# Patient Record
Sex: FEMALE | Race: BLACK OR AFRICAN AMERICAN | NOT HISPANIC OR LATINO | Employment: UNEMPLOYED | URBAN - METROPOLITAN AREA
[De-identification: names, ages, dates, MRNs, and addresses within clinical notes are randomized per-mention and may not be internally consistent; named-entity substitution may affect disease eponyms.]

---

## 2017-03-04 ENCOUNTER — HOSPITAL ENCOUNTER (EMERGENCY)
Facility: HOSPITAL | Age: 27
Discharge: HOME | End: 2017-03-04
Attending: FAMILY MEDICINE
Payer: MEDICAID

## 2017-03-04 VITALS
HEIGHT: 68 IN | DIASTOLIC BLOOD PRESSURE: 76 MMHG | SYSTOLIC BLOOD PRESSURE: 107 MMHG | HEART RATE: 115 BPM | RESPIRATION RATE: 15 BRPM | TEMPERATURE: 98 F | OXYGEN SATURATION: 98 %
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
## 2017-03-04 DIAGNOSIS — S43.402A SPRAIN SHOULDER/ARM, LEFT, INITIAL ENCOUNTER: Primary | ICD-10-CM

## 2017-03-04 PROCEDURE — 25000003 PHARM REV CODE 250: Performed by: FAMILY MEDICINE

## 2017-03-04 PROCEDURE — 99283 EMERGENCY DEPT VISIT LOW MDM: CPT

## 2017-03-04 RX ORDER — ACETAMINOPHEN 500 MG
1000 TABLET ORAL
Status: COMPLETED | OUTPATIENT
Start: 2017-03-04 | End: 2017-03-04

## 2017-03-04 RX ORDER — KETOROLAC TROMETHAMINE 10 MG/1
10 TABLET, FILM COATED ORAL EVERY 6 HOURS PRN
Qty: 20 TABLET | Refills: 0 | Status: SHIPPED | OUTPATIENT
Start: 2017-03-04 | End: 2017-03-09

## 2017-03-04 RX ORDER — KETOROLAC TROMETHAMINE 10 MG/1
10 TABLET, FILM COATED ORAL EVERY 6 HOURS PRN
Qty: 20 TABLET | Refills: 0 | Status: SHIPPED | OUTPATIENT
Start: 2017-03-04 | End: 2017-03-04

## 2017-03-04 RX ADMIN — ACETAMINOPHEN 1000 MG: 500 TABLET ORAL at 11:03
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
## 2017-03-04 NOTE — DISCHARGE INSTRUCTIONS
Shoulder Sprain  A sprain is a stretching or tearing of the ligaments that hold a joint together. A sprain may take up to 8 weeks to fully heal, depending on how severe it is. Moderate to severe shoulder sprains are treated with a sling or shoulder immobilizer. Minor sprains can be treated without any special support.  Home care  The following guidelines will help you care for your injury at home:  · If a sling was given to you, leave it in place for the time advised by your healthcare provider. If you arent sure how long to wear it, ask for advice. If the sling becomes loose, adjust it so that your forearm is level with the ground. Your shoulder should feel well supported.  · Put an ice pack on the injured area for 20 minutes every 1 to 2 hours the first day. You can make your own ice pack by putting ice cubes in a plastic bag. A bag of frozen peas or something similar works well too. Wrap the bag in a thin towel. Continue with ice packs 3 to 4 times a day for the next 2 to 3 days. Then use the pack as needed to ease pain and swelling.  · You may use acetaminophen or ibuprofen to control pain, unless another pain medicine was prescribed. If you have chronic liver or kidney disease, talk with your healthcare provider before using these medicines. Also talk with your provider if youve had a stomach ulcer or gastrointestinal bleeding.  · Shoulder joints become stiff if left in a sling for too long. You should start range of motion exercises about 7 to 10 days after the injury. Talk with your provider to find out what type of exercises to do and how soon to start.  Follow-up care  Follow up with your healthcare provider, or as advised.  Any X-rays you had today dont show any broken bones, breaks, or fractures. Sometimes fractures dont show up on the first X-ray. Bruises and sprains can sometimes hurt as much as a fracture. These injuries can take time to heal completely. If your symptoms dont improve or they get  worse, talk with your provider. You may need a repeat X-ray or other treatments.  When to seek medical advice  Call your healthcare provider right away if any of these occur:  · Shoulder pain or swelling in your arm that gets worse  · Fingers become cold, blue, numb, or tingly  · Large amount of bruising of the shoulder or upper arm  · Fever or chills  Date Last Reviewed: 8/1/2016  © 6523-2062 [employer]. [address] [place] [address], [place] [address]. All rights reserved. This information is not intended as a substitute for professional medical care. Always follow your healthcare professional's instructions.
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
## 2017-05-04 ENCOUNTER — HOSPITAL ENCOUNTER (EMERGENCY)
Facility: HOSPITAL | Age: 27
Discharge: HOME | End: 2017-05-04
Attending: FAMILY MEDICINE
Payer: MEDICAID

## 2017-05-04 VITALS
BODY MASS INDEX: 18.34 KG/M2 | DIASTOLIC BLOOD PRESSURE: 69 MMHG | HEIGHT: 68 IN | RESPIRATION RATE: 15 BRPM | WEIGHT: 121 LBS | OXYGEN SATURATION: 97 % | HEART RATE: 85 BPM | SYSTOLIC BLOOD PRESSURE: 108 MMHG | TEMPERATURE: 99 F
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
## 2017-05-04 DIAGNOSIS — S61.432A PUNCTURE WOUND OF LEFT HAND WITHOUT FOREIGN BODY, INITIAL ENCOUNTER: Primary | ICD-10-CM

## 2017-05-04 PROCEDURE — 25000003 PHARM REV CODE 250: Performed by: FAMILY MEDICINE

## 2017-05-04 PROCEDURE — 90715 TDAP VACCINE 7 YRS/> IM: CPT | Performed by: FAMILY MEDICINE

## 2017-05-04 PROCEDURE — 63600175 PHARM REV CODE 636 W HCPCS: Performed by: FAMILY MEDICINE

## 2017-05-04 PROCEDURE — 99283 EMERGENCY DEPT VISIT LOW MDM: CPT

## 2017-05-04 PROCEDURE — 90471 IMMUNIZATION ADMIN: CPT | Performed by: FAMILY MEDICINE

## 2017-05-04 RX ORDER — IBUPROFEN 400 MG/1
800 TABLET ORAL
Status: COMPLETED | OUTPATIENT
Start: 2017-05-04 | End: 2017-05-04

## 2017-05-04 RX ORDER — CEPHALEXIN 500 MG/1
500 CAPSULE ORAL EVERY 12 HOURS
Qty: 10 CAPSULE | Refills: 0 | Status: SHIPPED | OUTPATIENT
Start: 2017-05-04 | End: 2017-05-09

## 2017-05-04 RX ADMIN — IBUPROFEN 800 MG: 400 TABLET ORAL at 11:05

## 2017-05-04 RX ADMIN — CLOSTRIDIUM TETANI TOXOID ANTIGEN (FORMALDEHYDE INACTIVATED), CORYNEBACTERIUM DIPHTHERIAE TOXOID ANTIGEN (FORMALDEHYDE INACTIVATED), BORDETELLA PERTUSSIS TOXOID ANTIGEN (GLUTARALDEHYDE INACTIVATED), BORDETELLA PERTUSSIS FILAMENTOUS HEMAGGLUTININ ANTIGEN (FORMALDEHYDE INACTIVATED), BORDETELLA PERTUSSIS PERTACTIN ANTIGEN, AND BORDETELLA PERTUSSIS FIMBRIAE 2/3 ANTIGEN 0.5 ML: 5; 2; 2.5; 5; 3; 5 INJECTION, SUSPENSION INTRAMUSCULAR at 11:05

## 2017-05-04 RX ADMIN — BACITRACIN, NEOMYCIN, POLYMYXIN B 1 EACH: 400; 3.5; 5 OINTMENT TOPICAL at 11:05
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
## 2017-05-19 ENCOUNTER — HOSPITAL ENCOUNTER (EMERGENCY)
Facility: HOSPITAL | Age: 27
Discharge: HOME | End: 2017-05-19
Attending: FAMILY MEDICINE
Payer: MEDICAID

## 2017-05-19 VITALS
SYSTOLIC BLOOD PRESSURE: 112 MMHG | BODY MASS INDEX: 18.64 KG/M2 | HEART RATE: 70 BPM | OXYGEN SATURATION: 98 % | TEMPERATURE: 99 F | DIASTOLIC BLOOD PRESSURE: 64 MMHG | HEIGHT: 68 IN | WEIGHT: 123 LBS | RESPIRATION RATE: 18 BRPM

## 2017-05-19 DIAGNOSIS — M25.519 SHOULDER PAIN: Primary | ICD-10-CM

## 2017-05-19 PROCEDURE — 99283 EMERGENCY DEPT VISIT LOW MDM: CPT

## 2017-05-19 RX ORDER — BENZONATATE 100 MG/1
100 CAPSULE ORAL 4 TIMES DAILY
Status: ON HOLD | COMMUNITY
End: 2017-07-22 | Stop reason: HOSPADM

## 2017-05-19 RX ORDER — HYDROCODONE BITARTRATE AND ACETAMINOPHEN 5; 325 MG/1; MG/1
1 TABLET ORAL EVERY 8 HOURS PRN
Qty: 12 TABLET | Refills: 0 | Status: ON HOLD | OUTPATIENT
Start: 2017-05-19 | End: 2017-10-22 | Stop reason: HOSPADM

## 2017-05-19 NOTE — DISCHARGE INSTRUCTIONS
Arthralgia    Arthralgia is the term for pain in or around the joint. It is a symptom, not a disease. This pain may involve one or more joints. In some cases, the pain moves from joint to joint.  There are many causes for joint pain. These include:  · Injury  · Osteoarthritis (wearing out of the joint surface)  · Gout (inflammation of the joint due to crystals in the joint fluid)  · Infection inside the joint    · Bursitis (inflammation of the fluid-filled sacs around the joint)  · Autoimmune disorders such as rheumatoid arthritis or lupus  · Tendonitis (inflamation of chords that attach muscle to bone)  Home care  · Rest the involved joint(s) until your symptoms improve.   · You may be prescribed pain medication. If none is prescribed, you may use acetaminophen or ibuprofen to control pain and inflammation.  Follow up  Follow up with your healthcare provider or our staff as advised.  When to seek medical care  Contact your healthcare provider right away if any of the following occurs:  · Pain, swelling, or redness of joint increases  · Pain worsens or recurs after a period of improvement  · Pain moves to other joints  · You cannot bear weight on the affected joint   · You cannot move the affected joint  · Joint appears deformed  · New rash appears  · Fever of 101ºF (38.8ºC) or higher, or as directed by your healthcare provider  Date Last Reviewed: 4/26/2015  © 4290-7221 [employer]. [address] [place] [address], [place] [address]. All rights reserved. This information is not intended as a substitute for professional medical care. Always follow your healthcare professional's instructions.          AC Joint Sprain (Adult)    The AC or acromioclavicular joint is at the end of the collar bone, or clavicle, near the shoulder. The AC joint is made of 4 ligaments that hold the collar bone to the shoulder blade, or scapula. With an AC joint sprain, these ligaments may be partly or fully torn. In both cases this  causes pain and swelling at the end of the collar bone. If the ligaments are completely torn, the collar bone will rise up.  AC joint sprains are given a grade depending on whether they are mild, moderate, or severe:  · Grade 1. A mild sprain, with minor damage to the ligaments. The collar bone stays in place.  · Grade 2. A moderate sprain. The ligaments are partly torn. The collar bone is moved out of place. The injured shoulder may look lower and flatter than the other shoulder.  · Grade 3. The most severe kind of sprain. The ligaments are completely torn. The collar bone is no longer joined to the shoulder blade. The collar bone rises up. This creates a bump on top of the shoulder. The ligaments heal in this position, so the bump does not go away. It is possible to have surgery to correct the bump. But normal shoulder function will return even without surgery.  An AC sprain will take up to 6 weeks to heal, depending on how severe it is. It is often treated with a sling. Or a sling and an elastic wrap around the chest may be used. Physical therapy may be needed to help the shoulder keep full range of motion. Once healed, you can expect full recovery of shoulder function.  Home care  · Your provider may prescribe medicines for pain. Or you may use over-the-counter pain medicines. Talk with your provider beforetaking medicines if you have chronic liver or kidney disease, or have ever had a stomach ulcer or GI (gastrointestinal) bleeding.  · Make an appointment right away to see your provider or an orthopedic or bone doctor, for further evaluation and treatment of the injury.  · Use the injured area as little as possible. This will help decrease pain and swelling and allow the area to heal.  · Place an ice pack over the injured area for 15 minutes. Do this every 4 to 6 hours for the first 24 to 48 hours, or as directed. Keep using ice packs to ease pain and swelling as directed by your provider or the orthopedic  doctor.  · To make an ice pack, put ice cubes in a plastic bag that seals at the top. Wrap the bag in a clean, thin towel or cloth. Never put ice or an ice pack directly on the skin. The ice pack can be put right on the wrap or sling. As the ice melts, be careful to not get the wrap or sling wet.  · A sling alone is often enough. Sometime a sling and a wrap around the chest may be used to help ease pain, if needed. This also helps keep your injured arm from moving. Use these devices as advised until you are seen by your healthcare provider or the orthopedic doctor. Always ask when the wrap should be worn. Always ask when the wrap can be removed.  · Wear the sling while awake or as advised, until you are scheduled to see your healthcare provider or an orthopedic doctor. You may remove the sling to sleep. You may remove the sling to bathe. Make sure the sling is comfortable and keeps your arm raised, as advised by the provider. Follow the providers instructions on how to use the sling. Always ask when you should wear the sling. Always ask when the sling can be removed.  · Shoulder joints become stiff if they are kept still for too long. Talk with your healthcare provider or the orthopedic doctor about range of motion exercises and possible physical therapy.  Follow-up care  Follow up with your healthcare provider, or as advised. Your provider may refer you to a specialist such as an orthopedic, or bone, doctor.  If X-rays were taken, you will be told of any new findings that may affect your care.  When to seek medical advice  Call your healthcare provider right away if any of these occur:  · Your shoulder looks off-balance  · You have increased pain, swelling, or bruising  · You have increased pain even after using prescribed pain medicine  · You have continuing pain   · Your hand or arm becomes cold, blue, numb, or tingly  · You have trouble moving your shoulder, wrist, or elbow due to stiffness  Date Last Reviewed:  10/8/2015  © 2023-9199 [employer]. [address] [place] [address], [place] [address]. All rights reserved. This information is not intended as a substitute for professional medical care. Always follow your healthcare professional's instructions.

## 2017-05-19 NOTE — ED AVS SNAPSHOT
[facility]  [address]  [place] [id number]               Diana Diop   2017 12:40 PM   ED    Description:  Female : 1990   Department:  [facility]           Your Care was Coordinated By:     Provider Role From To    Elias Schmidt MD Attending Provider 17 1252 --      Reason for Visit     costochondritis           Diagnoses this Visit        Comments    Shoulder pain    -  Primary       ED Disposition     ED Disposition Condition Comment    Discharge             To Do List           Follow-up Information     Follow up with KARLA Blanco.    Specialty:  Family Medicine    Contact information:    [id number]  [place] [id number]  [phone]         These Medications        Disp Refills Start End    hydrocodone-acetaminophen 5-325mg (NORCO) 5-325 mg per tablet 12 tablet 0 2017     Take 1 tablet by mouth every 8 (eight) hours as needed for Pain. - Oral    [facility] [address] [place] [address] [address] [facility] Ph #: [phone]         [facility]     [facility] -  Assistance  Unless otherwise directed by your provider, please contact [facility], our nurse care line that is available for  assistance.     Registered nurses in the [facility] provide: appointment scheduling, clinical advisement, health education, and other advisory services.  Call: [phone] (toll free)               Medications           Message regarding Medications     Verify the changes and/or additions to your medication regime listed below are the same as discussed with your clinician today.  If any of these changes or additions are incorrect, please notify your healthcare provider.        START taking these NEW medications        Refills    hydrocodone-acetaminophen 5-325mg (NORCO) 5-325 mg per tablet 0    Sig: Take 1 tablet  "by mouth every 8 (eight) hours as needed for Pain.    Class: Print    Route: Oral           Verify that the below list of medications is an accurate representation of the medications you are currently taking.  If none reported, the list may be blank. If incorrect, please contact your healthcare provider. Carry this list with you in case of emergency.           Current Medications     benzonatate (TESSALON) 100 MG capsule Take 100 mg by mouth 4 (four) times daily.    ferrous sulfate 325 mg (65 mg iron) Tab tablet Take 1 tablet (325 mg total) by mouth daily with breakfast.    hydrocodone-acetaminophen 5-325mg (NORCO) 5-325 mg per tablet Take 1 tablet by mouth every 8 (eight) hours as needed for Pain.           Clinical Reference Information           Your Vitals Were     BP Pulse Temp Resp Height Weight    110/63 (BP Location: Left arm, Patient Position: Sitting) 68 99 °F (37.2 °C) (Oral) 17 5' 8" (1.727 m) 55.8 kg (123 lb)    Last Period SpO2 BMI          05/19/2017 (Exact Date) 100% 18.7 kg/m2        Allergies as of 5/19/2017     No Known Allergies      Immunizations Administered on Date of Encounter - 5/19/2017     None      ED Micro, Lab, POCT     None      ED Imaging Orders     Start Ordered       Status Ordering Provider    05/19/17 1258 05/19/17 1257  X-Ray Shoulder Complete 2 View Right  1 time imaging      Final result         Discharge Instructions         Arthralgia    Arthralgia is the term for pain in or around the joint. It is a symptom, not a disease. This pain may involve one or more joints. In some cases, the pain moves from joint to joint.  There are many causes for joint pain. These include:  · Injury  · Osteoarthritis (wearing out of the joint surface)  · Gout (inflammation of the joint due to crystals in the joint fluid)  · Infection inside the joint    · Bursitis (inflammation of the fluid-filled sacs around the joint)  · Autoimmune disorders such as rheumatoid arthritis or lupus  · Tendonitis " (inflamation of chords that attach muscle to bone)  Home care  · Rest the involved joint(s) until your symptoms improve.   · You may be prescribed pain medication. If none is prescribed, you may use acetaminophen or ibuprofen to control pain and inflammation.  Follow up  Follow up with your healthcare provider or our staff as advised.  When to seek medical care  Contact your healthcare provider right away if any of the following occurs:  · Pain, swelling, or redness of joint increases  · Pain worsens or recurs after a period of improvement  · Pain moves to other joints  · You cannot bear weight on the affected joint   · You cannot move the affected joint  · Joint appears deformed  · New rash appears  · Fever of 101ºF (38.8ºC) or higher, or as directed by your healthcare provider  Date Last Reviewed: 4/26/2015 © 2000-2016 [employer]. [address] [place] [address]. All rights reserved. This information is not intended as a substitute for professional medical care. Always follow your healthcare professional's instructions.          AC Joint Sprain (Adult)    The AC or acromioclavicular joint is at the end of the collar bone, or clavicle, near the shoulder. The AC joint is made of 4 ligaments that hold the collar bone to the shoulder blade, or scapula. With an AC joint sprain, these ligaments may be partly or fully torn. In both cases this causes pain and swelling at the end of the collar bone. If the ligaments are completely torn, the collar bone will rise up.  AC joint sprains are given a grade depending on whether they are mild, moderate, or severe:  · Grade 1. A mild sprain, with minor damage to the ligaments. The collar bone stays in place.  · Grade 2. A moderate sprain. The ligaments are partly torn. The collar bone is moved out of place. The injured shoulder may look lower and flatter than the other shoulder.  · Grade 3. The most severe kind of sprain. The ligaments are completely  torn. The collar bone is no longer joined to the shoulder blade. The collar bone rises up. This creates a bump on top of the shoulder. The ligaments heal in this position, so the bump does not go away. It is possible to have surgery to correct the bump. But normal shoulder function will return even without surgery.  An AC sprain will take up to 6 weeks to heal, depending on how severe it is. It is often treated with a sling. Or a sling and an elastic wrap around the chest may be used. Physical therapy may be needed to help the shoulder keep full range of motion. Once healed, you can expect full recovery of shoulder function.  Home care  · Your provider may prescribe medicines for pain. Or you may use over-the-counter pain medicines. Talk with your provider beforetaking medicines if you have chronic liver or kidney disease, or have ever had a stomach ulcer or GI (gastrointestinal) bleeding.  · Make an appointment right away to see your provider or an orthopedic or bone doctor, for further evaluation and treatment of the injury.  · Use the injured area as little as possible. This will help decrease pain and swelling and allow the area to heal.  · Place an ice pack over the injured area for 15 minutes. Do this every 4 to 6 hours for the first 24 to 48 hours, or as directed. Keep using ice packs to ease pain and swelling as directed by your provider or the orthopedic doctor.  · To make an ice pack, put ice cubes in a plastic bag that seals at the top. Wrap the bag in a clean, thin towel or cloth. Never put ice or an ice pack directly on the skin. The ice pack can be put right on the wrap or sling. As the ice melts, be careful to not get the wrap or sling wet.  · A sling alone is often enough. Sometime a sling and a wrap around the chest may be used to help ease pain, if needed. This also helps keep your injured arm from moving. Use these devices as advised until you are seen by your healthcare provider or the orthopedic  doctor. Always ask when the wrap should be worn. Always ask when the wrap can be removed.  · Wear the sling while awake or as advised, until you are scheduled to see your healthcare provider or an orthopedic doctor. You may remove the sling to sleep. You may remove the sling to bathe. Make sure the sling is comfortable and keeps your arm raised, as advised by the provider. Follow the providers instructions on how to use the sling. Always ask when you should wear the sling. Always ask when the sling can be removed.  · Shoulder joints become stiff if they are kept still for too long. Talk with your healthcare provider or the orthopedic doctor about range of motion exercises and possible physical therapy.  Follow-up care  Follow up with your healthcare provider, or as advised. Your provider may refer you to a specialist such as an orthopedic, or bone, doctor.  If X-rays were taken, you will be told of any new findings that may affect your care.  When to seek medical advice  Call your healthcare provider right away if any of these occur:  · Your shoulder looks off-balance  · You have increased pain, swelling, or bruising  · You have increased pain even after using prescribed pain medicine  · You have continuing pain   · Your hand or arm becomes cold, blue, numb, or tingly  · You have trouble moving your shoulder, wrist, or elbow due to stiffness  Date Last Reviewed: 10/8/2015  © 8364-1501 [employer]. [address] [place] [address]. All rights reserved. This information is not intended as a substitute for professional medical care. Always follow your healthcare professional's instructions.           [facility] complies with applicable Federal civil rights laws and does not discriminate on the basis of race, color, national origin, age, disability, or sex.        Language Assistance Services     ATTENTION: Language assistance services are available, free of charge. Please  call [phone].      ATENCIÓN: Si habla español, tiene a dodson disposición servicios gratuitos de asistencia lingüística. Llame al [phone].     CHÚ Ý: N?u b?n nói Ti?ng Vi?t, có các d?ch v? h? tr? ngôn ng? mi?n phí dành cho b?n. G?i s? [phone].

## 2017-05-19 NOTE — ED TRIAGE NOTES
"Pt states " I had a cough 2 weeks ago. Last week my right shoulder, ribs started to hurt" reports pain worsens with breathing   "

## 2017-05-19 NOTE — ED PROVIDER NOTES
Encounter Date: 2017       History     Chief Complaint   Patient presents with    costochondritis     Pt was seen in ED in [facility] recently and told she has acute bronchitis and she needs something for pain from her shoulders to the bottoms of her ribs.     Review of patient's allergies indicates:  No Known Allergies  Patient presents with chief complaint of right shoulder pain down to her rib margins was seen at [facility] a few days ago and diagnosed with acute bronchitis.  Patient denies any fever or chills.  States the pain is worse when she moves her arm.      The history is provided by the patient.     Past Medical History:   Diagnosis Date    Anemia     Encounter for blood transfusion      Past Surgical History:   Procedure Laterality Date     SECTION      mass left breast removed  2006    non-cancerous per pt.     TUBAL LIGATION       Family History   Problem Relation Age of Onset    Diabetes Maternal Grandmother     Hypertension Maternal Grandmother     Diabetes Paternal Grandmother     Hypertension Paternal Grandmother      Social History   Substance Use Topics    Smoking status: Never Smoker    Smokeless tobacco: None    Alcohol use Yes      Comment: Occasionally     Review of Systems   Constitutional: Negative for chills and fever.   Cardiovascular: Negative for chest pain and leg swelling.   Musculoskeletal: Positive for arthralgias.   All other systems reviewed and are negative.      Physical Exam   Initial Vitals   BP Pulse Resp Temp SpO2   17 1243 17 1243 17 1243 17 1246 17 1243   110/63 68 17 99 °F (37.2 °C) 100 %     Physical Exam    Nursing note and vitals reviewed.  Constitutional: She appears well-developed and well-nourished.   HENT:   Head: Normocephalic and atraumatic.   Eyes: Pupils are equal, round, and reactive to light.   Neck: Normal range of motion. Neck supple.   Cardiovascular: Normal rate and regular rhythm.    Pulmonary/Chest: Breath sounds normal.   Abdominal: Soft.   Musculoskeletal: She exhibits tenderness.        Right shoulder: She exhibits tenderness.        Arms:  Lymphadenopathy:     She has no cervical adenopathy.   Neurological: She is alert and oriented to person, place, and time.   Skin: Skin is warm.   Psychiatric: She has a normal mood and affect.         ED Course   Procedures  Labs Reviewed - No data to display                            ED Course     Clinical Impression:   The encounter diagnosis was Shoulder pain.          Elias Schmidt MD  06/02/17 1940

## 2017-07-20 ENCOUNTER — HOSPITAL ENCOUNTER (INPATIENT)
Facility: HOSPITAL | Age: 27
LOS: 1 days | Discharge: HOME | DRG: 812 | End: 2017-07-22
Attending: EMERGENCY MEDICINE | Admitting: HOSPITALIST
Payer: MEDICAID

## 2017-07-20 DIAGNOSIS — D64.9 ANEMIA, UNSPECIFIED TYPE: ICD-10-CM

## 2017-07-20 DIAGNOSIS — R55 SYNCOPE, UNSPECIFIED SYNCOPE TYPE: Primary | ICD-10-CM

## 2017-07-20 DIAGNOSIS — G44.049 CHRONIC PAROXYSMAL HEMICRANIA, NOT INTRACTABLE: ICD-10-CM

## 2017-07-20 LAB
ABO + RH BLD: NORMAL
ALBUMIN SERPL BCP-MCNC: 4.1 G/DL
ALP SERPL-CCNC: 67 U/L
ALT SERPL W/O P-5'-P-CCNC: 6 U/L
ANION GAP SERPL CALC-SCNC: 7 MMOL/L
ANISOCYTOSIS BLD QL SMEAR: SLIGHT
AST SERPL-CCNC: 15 U/L
B-HCG UR QL: NEGATIVE
BASOPHILS # BLD AUTO: 0.02 K/UL
BASOPHILS NFR BLD: 0.5 %
BILIRUB SERPL-MCNC: 0.7 MG/DL
BILIRUB UR QL STRIP: NEGATIVE
BLD GP AB SCN CELLS X3 SERPL QL: NORMAL
BUN SERPL-MCNC: 11 MG/DL
CALCIUM SERPL-MCNC: 9.7 MG/DL
CHLORIDE SERPL-SCNC: 104 MMOL/L
CLARITY UR: CLEAR
CO2 SERPL-SCNC: 27 MMOL/L
COLOR UR: YELLOW
CREAT SERPL-MCNC: 1 MG/DL
CTP QC/QA: YES
DACRYOCYTES BLD QL SMEAR: ABNORMAL
DIFFERENTIAL METHOD: ABNORMAL
EOSINOPHIL # BLD AUTO: 0.1 K/UL
EOSINOPHIL NFR BLD: 1.7 %
ERYTHROCYTE [DISTWIDTH] IN BLOOD BY AUTOMATED COUNT: 18.9 %
EST. GFR  (AFRICAN AMERICAN): >60 ML/MIN/1.73 M^2
EST. GFR  (NON AFRICAN AMERICAN): >60 ML/MIN/1.73 M^2
GIANT PLATELETS BLD QL SMEAR: PRESENT
GLUCOSE SERPL-MCNC: 121 MG/DL
GLUCOSE UR QL STRIP: NEGATIVE
HCT VFR BLD AUTO: 24.2 %
HGB BLD-MCNC: 6.6 G/DL
HGB UR QL STRIP: NEGATIVE
HYPOCHROMIA BLD QL SMEAR: ABNORMAL
KETONES UR QL STRIP: NEGATIVE
LEUKOCYTE ESTERASE UR QL STRIP: NEGATIVE
LYMPHOCYTES # BLD AUTO: 1.7 K/UL
LYMPHOCYTES NFR BLD: 42.1 %
MCH RBC QN AUTO: 18.1 PG
MCHC RBC AUTO-ENTMCNC: 27.3 G/DL
MCV RBC AUTO: 67 FL
MONOCYTES # BLD AUTO: 0.4 K/UL
MONOCYTES NFR BLD: 10.6 %
NEUTROPHILS # BLD AUTO: 1.8 K/UL
NEUTROPHILS NFR BLD: 45.1 %
NITRITE UR QL STRIP: NEGATIVE
OVALOCYTES BLD QL SMEAR: ABNORMAL
PH UR STRIP: 7 [PH] (ref 5–8)
PLATELET # BLD AUTO: 432 K/UL
PLATELET BLD QL SMEAR: ABNORMAL
PMV BLD AUTO: 9.8 FL
POIKILOCYTOSIS BLD QL SMEAR: SLIGHT
POLYCHROMASIA BLD QL SMEAR: ABNORMAL
POTASSIUM SERPL-SCNC: 3.9 MMOL/L
PROT SERPL-MCNC: 8 G/DL
PROT UR QL STRIP: NEGATIVE
RBC # BLD AUTO: 3.64 M/UL
SODIUM SERPL-SCNC: 138 MMOL/L
SP GR UR STRIP: 1.02 (ref 1–1.03)
TROPONIN I SERPL DL<=0.01 NG/ML-MCNC: 0.01 NG/ML
URN SPEC COLLECT METH UR: ABNORMAL
UROBILINOGEN UR STRIP-ACNC: ABNORMAL EU/DL
WBC # BLD AUTO: 4.04 K/UL

## 2017-07-20 PROCEDURE — 85025 COMPLETE CBC W/AUTO DIFF WBC: CPT

## 2017-07-20 PROCEDURE — 81025 URINE PREGNANCY TEST: CPT | Performed by: NURSE PRACTITIONER

## 2017-07-20 PROCEDURE — 86900 BLOOD TYPING SEROLOGIC ABO: CPT

## 2017-07-20 PROCEDURE — 99284 EMERGENCY DEPT VISIT MOD MDM: CPT | Mod: 25

## 2017-07-20 PROCEDURE — 80053 COMPREHEN METABOLIC PANEL: CPT

## 2017-07-20 PROCEDURE — 93005 ELECTROCARDIOGRAM TRACING: CPT

## 2017-07-20 PROCEDURE — 12000002 HC ACUTE/MED SURGE SEMI-PRIVATE ROOM

## 2017-07-20 PROCEDURE — 86920 COMPATIBILITY TEST SPIN: CPT

## 2017-07-20 PROCEDURE — 86901 BLOOD TYPING SEROLOGIC RH(D): CPT

## 2017-07-20 PROCEDURE — 84484 ASSAY OF TROPONIN QUANT: CPT

## 2017-07-20 PROCEDURE — 81003 URINALYSIS AUTO W/O SCOPE: CPT

## 2017-07-20 RX ORDER — HYDROCODONE BITARTRATE AND ACETAMINOPHEN 500; 5 MG/1; MG/1
TABLET ORAL
Status: DISCONTINUED | OUTPATIENT
Start: 2017-07-21 | End: 2017-07-21

## 2017-07-21 PROBLEM — G44.049 CHRONIC PAROXYSMAL HEMICRANIA, NOT INTRACTABLE: Status: ACTIVE | Noted: 2017-07-21

## 2017-07-21 PROBLEM — R55 SYNCOPE: Status: ACTIVE | Noted: 2017-07-21

## 2017-07-21 PROBLEM — D64.9 SYMPTOMATIC ANEMIA: Status: ACTIVE | Noted: 2017-07-21

## 2017-07-21 LAB
ALBUMIN SERPL BCP-MCNC: 3.6 G/DL
ALP SERPL-CCNC: 60 U/L
ALT SERPL W/O P-5'-P-CCNC: 20 U/L
ANION GAP SERPL CALC-SCNC: 6 MMOL/L
ANISOCYTOSIS BLD QL SMEAR: SLIGHT
AST SERPL-CCNC: 37 U/L
BASOPHILS # BLD AUTO: 0.03 K/UL
BASOPHILS NFR BLD: 0.6 %
BILIRUB SERPL-MCNC: 1.4 MG/DL
BLD PROD TYP BPU: NORMAL
BLD PROD TYP BPU: NORMAL
BLOOD UNIT EXPIRATION DATE: NORMAL
BLOOD UNIT EXPIRATION DATE: NORMAL
BLOOD UNIT TYPE CODE: 7300
BLOOD UNIT TYPE CODE: 7300
BLOOD UNIT TYPE: NORMAL
BLOOD UNIT TYPE: NORMAL
BUN SERPL-MCNC: 13 MG/DL
CALCIUM SERPL-MCNC: 9.1 MG/DL
CHLORIDE SERPL-SCNC: 105 MMOL/L
CO2 SERPL-SCNC: 28 MMOL/L
CODING SYSTEM: NORMAL
CODING SYSTEM: NORMAL
CREAT SERPL-MCNC: 0.8 MG/DL
DIFFERENTIAL METHOD: ABNORMAL
DISPENSE STATUS: NORMAL
DISPENSE STATUS: NORMAL
EOSINOPHIL # BLD AUTO: 0.1 K/UL
EOSINOPHIL NFR BLD: 1.4 %
ERYTHROCYTE [DISTWIDTH] IN BLOOD BY AUTOMATED COUNT: 21.4 %
EST. GFR  (AFRICAN AMERICAN): >60 ML/MIN/1.73 M^2
EST. GFR  (NON AFRICAN AMERICAN): >60 ML/MIN/1.73 M^2
GLUCOSE SERPL-MCNC: 83 MG/DL
HCT VFR BLD AUTO: 26.2 %
HGB BLD-MCNC: 7.3 G/DL
HYPOCHROMIA BLD QL SMEAR: ABNORMAL
LYMPHOCYTES # BLD AUTO: 1.8 K/UL
LYMPHOCYTES NFR BLD: 34.6 %
MCH RBC QN AUTO: 19.6 PG
MCHC RBC AUTO-ENTMCNC: 27.9 G/DL
MCV RBC AUTO: 70 FL
MONOCYTES # BLD AUTO: 0.6 K/UL
MONOCYTES NFR BLD: 12 %
NEUTROPHILS # BLD AUTO: 2.6 K/UL
NEUTROPHILS NFR BLD: 51.6 %
PLATELET # BLD AUTO: 353 K/UL
PMV BLD AUTO: 10.3 FL
POTASSIUM SERPL-SCNC: 3.5 MMOL/L
PROT SERPL-MCNC: 6.8 G/DL
RBC # BLD AUTO: 3.73 M/UL
SODIUM SERPL-SCNC: 139 MMOL/L
TRANS ERYTHROCYTES VOL PATIENT: NORMAL ML
TRANS ERYTHROCYTES VOL PATIENT: NORMAL ML
WBC # BLD AUTO: 5.08 K/UL

## 2017-07-21 PROCEDURE — 25000003 PHARM REV CODE 250: Performed by: EMERGENCY MEDICINE

## 2017-07-21 PROCEDURE — P9021 RED BLOOD CELLS UNIT: HCPCS

## 2017-07-21 PROCEDURE — 36415 COLL VENOUS BLD VENIPUNCTURE: CPT

## 2017-07-21 PROCEDURE — 80053 COMPREHEN METABOLIC PANEL: CPT

## 2017-07-21 PROCEDURE — 85025 COMPLETE CBC W/AUTO DIFF WBC: CPT

## 2017-07-21 PROCEDURE — 12000002 HC ACUTE/MED SURGE SEMI-PRIVATE ROOM

## 2017-07-21 PROCEDURE — 25000003 PHARM REV CODE 250: Performed by: HOSPITALIST

## 2017-07-21 RX ORDER — SODIUM CHLORIDE 0.9 % (FLUSH) 0.9 %
3 SYRINGE (ML) INJECTION EVERY 8 HOURS
Status: DISCONTINUED | OUTPATIENT
Start: 2017-07-21 | End: 2017-07-22 | Stop reason: HOSPADM

## 2017-07-21 RX ORDER — ONDANSETRON 2 MG/ML
4 INJECTION INTRAMUSCULAR; INTRAVENOUS EVERY 12 HOURS PRN
Status: DISCONTINUED | OUTPATIENT
Start: 2017-07-21 | End: 2017-07-22 | Stop reason: HOSPADM

## 2017-07-21 RX ORDER — FAMOTIDINE 20 MG/1
20 TABLET, FILM COATED ORAL DAILY
Status: DISCONTINUED | OUTPATIENT
Start: 2017-07-21 | End: 2017-07-21

## 2017-07-21 RX ORDER — HYDROCODONE BITARTRATE AND ACETAMINOPHEN 500; 5 MG/1; MG/1
TABLET ORAL
Status: DISCONTINUED | OUTPATIENT
Start: 2017-07-21 | End: 2017-07-22 | Stop reason: HOSPADM

## 2017-07-21 RX ORDER — HEPARIN SODIUM 5000 [USP'U]/ML
5000 INJECTION, SOLUTION INTRAVENOUS; SUBCUTANEOUS EVERY 8 HOURS
Status: DISCONTINUED | OUTPATIENT
Start: 2017-07-21 | End: 2017-07-21

## 2017-07-21 RX ORDER — HYDROCODONE BITARTRATE AND ACETAMINOPHEN 5; 325 MG/1; MG/1
1 TABLET ORAL EVERY 4 HOURS PRN
Status: DISCONTINUED | OUTPATIENT
Start: 2017-07-21 | End: 2017-07-22 | Stop reason: HOSPADM

## 2017-07-21 RX ORDER — AMOXICILLIN 250 MG
1 CAPSULE ORAL 2 TIMES DAILY
Status: DISCONTINUED | OUTPATIENT
Start: 2017-07-21 | End: 2017-07-22 | Stop reason: HOSPADM

## 2017-07-21 RX ORDER — HYDROCODONE BITARTRATE AND ACETAMINOPHEN 10; 325 MG/1; MG/1
1 TABLET ORAL EVERY 4 HOURS PRN
Status: DISCONTINUED | OUTPATIENT
Start: 2017-07-21 | End: 2017-07-22 | Stop reason: HOSPADM

## 2017-07-21 RX ORDER — FERROUS SULFATE 325(65) MG
325 TABLET, DELAYED RELEASE (ENTERIC COATED) ORAL 2 TIMES DAILY
Status: DISCONTINUED | OUTPATIENT
Start: 2017-07-21 | End: 2017-07-22 | Stop reason: HOSPADM

## 2017-07-21 RX ORDER — ACETAMINOPHEN 325 MG/1
650 TABLET ORAL EVERY 8 HOURS PRN
Status: DISCONTINUED | OUTPATIENT
Start: 2017-07-21 | End: 2017-07-22 | Stop reason: HOSPADM

## 2017-07-21 RX ORDER — IBUPROFEN 600 MG/1
600 TABLET ORAL
Status: COMPLETED | OUTPATIENT
Start: 2017-07-21 | End: 2017-07-21

## 2017-07-21 RX ADMIN — FERROUS SULFATE TAB EC 325 MG (65 MG FE EQUIVALENT) 325 MG: 325 (65 FE) TABLET DELAYED RESPONSE at 09:07

## 2017-07-21 RX ADMIN — SODIUM CHLORIDE, PRESERVATIVE FREE 3 ML: 5 INJECTION INTRAVENOUS at 10:07

## 2017-07-21 RX ADMIN — DOCUSATE SODIUM AND SENNOSIDES 1 TABLET: 8.6; 5 TABLET, FILM COATED ORAL at 09:07

## 2017-07-21 RX ADMIN — ACETAMINOPHEN 650 MG: 325 TABLET, FILM COATED ORAL at 08:07

## 2017-07-21 RX ADMIN — SODIUM CHLORIDE, PRESERVATIVE FREE 3 ML: 5 INJECTION INTRAVENOUS at 07:07

## 2017-07-21 RX ADMIN — IBUPROFEN 600 MG: 600 TABLET, FILM COATED ORAL at 02:07

## 2017-07-21 RX ADMIN — FAMOTIDINE 20 MG: 20 TABLET, FILM COATED ORAL at 09:07

## 2017-07-21 RX ADMIN — SODIUM CHLORIDE, PRESERVATIVE FREE 3 ML: 5 INJECTION INTRAVENOUS at 05:07

## 2017-07-21 RX ADMIN — HYDROCODONE BITARTRATE AND ACETAMINOPHEN 1 TABLET: 10; 325 TABLET ORAL at 09:07

## 2017-07-21 RX ADMIN — ACETAMINOPHEN 650 MG: 325 TABLET, FILM COATED ORAL at 09:07

## 2017-07-21 RX ADMIN — HEPARIN SODIUM 5000 UNITS: 5000 INJECTION, SOLUTION INTRAVENOUS; SUBCUTANEOUS at 05:07

## 2017-07-21 RX ADMIN — FERROUS SULFATE TAB EC 325 MG (65 MG FE EQUIVALENT) 325 MG: 325 (65 FE) TABLET DELAYED RESPONSE at 10:07

## 2017-07-21 NOTE — ASSESSMENT & PLAN NOTE
Secondary to anemia   No cardiac issues  Head Ct without abnormalities  Monitor with ambulation

## 2017-07-21 NOTE — ASSESSMENT & PLAN NOTE
Secondary to chronic blood loss/heavy menses  S/p one unit PRBCs  Transfuse today and repeat h/h  Needs GYN follow up

## 2017-07-21 NOTE — ED NOTES
Pt is stable condition with no acute distress noted. Meal provided per MD order. Tolerating transfusion well at this time. Will continue to monitor.

## 2017-07-21 NOTE — ED TRIAGE NOTES
"Pt arrives to ED via personal transportation with c/o syncopal episode earlier today, states "I blacked out earlier today this has happened to me before I am anemic" also reports dizziness x one week. Hx of anemia. Denies chest pain, SOB, or any other symptoms at this time.  "

## 2017-07-21 NOTE — SUBJECTIVE & OBJECTIVE
Past Medical History:   Diagnosis Date    Anemia     Encounter for blood transfusion        Past Surgical History:   Procedure Laterality Date     SECTION      mass left breast removed  2006    non-cancerous per pt.     TUBAL LIGATION         Review of patient's allergies indicates:  No Known Allergies    No current facility-administered medications on file prior to encounter.      Current Outpatient Prescriptions on File Prior to Encounter   Medication Sig    ferrous sulfate 325 mg (65 mg iron) Tab tablet Take 1 tablet (325 mg total) by mouth daily with breakfast.    benzonatate (TESSALON) 100 MG capsule Take 100 mg by mouth 4 (four) times daily.    hydrocodone-acetaminophen 5-325mg (NORCO) 5-325 mg per tablet Take 1 tablet by mouth every 8 (eight) hours as needed for Pain.     Family History     Problem Relation (Age of Onset)    Diabetes Maternal Grandmother, Paternal Grandmother    Hypertension Maternal Grandmother, Paternal Grandmother        Social History Main Topics    Smoking status: Never Smoker    Smokeless tobacco: Never Used    Alcohol use Yes      Comment: Occasionally    Drug use:      Types: Marijuana      Comment: daily    Sexual activity: Yes     Partners: Male     Birth control/ protection: None     Review of Systems   Constitutional: Positive for fatigue.   Eyes: Negative.    Respiratory: Negative.    Cardiovascular: Negative.    Gastrointestinal: Negative.    Genitourinary: Positive for menstrual problem and vaginal bleeding.   Musculoskeletal: Negative.    Neurological: Positive for dizziness, light-headedness and headaches.   Hematological: Negative.    Psychiatric/Behavioral: Negative.      Objective:     Vital Signs (Most Recent):  Temp: 98 °F (36.7 °C) (17 033)  Pulse: 65 (17)  Resp: 18 (17)  BP: 115/74 (17)  SpO2: 100 % (17) Vital Signs (24h Range):  Temp:  [97.5 °F (36.4 °C)-98.5 °F (36.9 °C)] 98 °F (36.7  °C)  Pulse:  [58-97] 65  Resp:  [16-18] 18  SpO2:  [100 %] 100 %  BP: (100-134)/(57-82) 115/74     Weight: 54.4 kg (120 lb)  Body mass index is 18.25 kg/m².    Physical Exam   Constitutional: She is oriented to person, place, and time. She appears well-developed and well-nourished. No distress.   HENT:   Head: Normocephalic and atraumatic.   Mouth/Throat: Oropharynx is clear and moist.   Eyes: EOM are normal.   Pale conjunctivae    Neck: Normal range of motion. Neck supple. No JVD present.   Cardiovascular: Normal rate, regular rhythm, normal heart sounds and intact distal pulses.    No murmur heard.  Pulmonary/Chest: Effort normal and breath sounds normal. No respiratory distress.   Abdominal: Soft. Bowel sounds are normal. She exhibits no distension and no mass. There is no tenderness.   Musculoskeletal: Normal range of motion. She exhibits no edema or deformity.   Neurological: She is alert and oriented to person, place, and time.   Skin: Skin is warm and dry. Capillary refill takes less than 2 seconds.   Psychiatric: She has a normal mood and affect. Her behavior is normal.        Significant Labs:   CBC:   Recent Labs  Lab 07/20/17 2032 07/21/17  0526   WBC 4.04 5.08   HGB 6.6* 7.3*   HCT 24.2* 26.2*   * 353*     CMP:   Recent Labs  Lab 07/20/17 2032 07/21/17  0526    139   K 3.9 3.5    105   CO2 27 28   * 83   BUN 11 13   CREATININE 1.0 0.8   CALCIUM 9.7 9.1   PROT 8.0 6.8   ALBUMIN 4.1 3.6   BILITOT 0.7 1.4*   ALKPHOS 67 60   AST 15 37   ALT 6* 20   ANIONGAP 7* 6*   EGFRNONAA >60 >60     Coagulation: No results for input(s): INR, APTT in the last 48 hours.    Invalid input(s): PT  Urine Studies:   Recent Labs  Lab 07/20/17 2000   COLORU Yellow   APPEARANCEUA Clear   PHUR 7.0   SPECGRAV 1.020   PROTEINUA Negative   GLUCUA Negative   KETONESU Negative   BILIRUBINUA Negative   OCCULTUA Negative   NITRITE Negative   UROBILINOGEN 2.0-3.0*   LEUKOCYTESUR Negative       Significant  Imaging: I have reviewed all pertinent imaging results/findings within the past 24 hours.

## 2017-07-21 NOTE — ED TRIAGE NOTES
"Pt presents to ED room with c/o "blacking out" at apprx 2:00pm today. Pt states that she sat down when starting to feel lightheaded and dizzy avoiding any falls. Pt states states that she has a hx of syncope episodes and anemia. Pt denies SOB at this time or chest pain. No acute distress noted. Pt does report at headache 5/10 on pain scale.  "

## 2017-07-21 NOTE — HPI
Pt is 27 yo female with chronic anemia due to heavy menses and chronic headaches presented with report of syncope yesterday. She reports prior to LOC was dizziness, light-headed and headaches. She denies CP, palpitations, GIB,  SOB at rest but occasionally gets RODRIGUEZ. She has had multiple blood transfusions for her anemia and supposed to take iron supplements. She does not follow with GYN for heavy menses. Last menstrual cycle was two weeks ago. Last transfusion in June 2016. No h/o sickle cell anemia or other blood disorders. On admit her h/h was 6.6/24.2.  She has received one unit of blood and h/h mike to 7.3/26.2.  She still has headaches to left side 7/10 intensity and lightheaded.

## 2017-07-21 NOTE — ED PROVIDER NOTES
"Encounter Date: 2017    SCRIBE #1 NOTE: I, Viktor Blanco, am scribing for, and in the presence of,  Cb Bergman MD. I have scribed the following portions of the note - Other sections scribed: ROS, HPI.       History     Chief Complaint   Patient presents with    Loss of Consciousness     "I blacked out today around 230p.  I felt myself getting dizzy so I sat myself down". Had a HA earlier and took an Advil. Hx of anemia.     CC: Loss of Consciousness    HPI: Patient is a 26 y.o. F with a past medical history of Anemia and Encounter for blood transfusion who presents to the ED for evaluation after losing consciousness 10 hours ago. Patient reports feeling light-headed, so she sat down prior to losing consciousness. She also reports dizziness on standing and a "minor" headache. She attempted treatment of her headache with Advil with mild relief. Patient denies any injury, chest pain, and/or shortness of breath. The source of her anemia has yet to be determined, but she endorses heavy bleeding during her menstrual cycles. Patient was prescribed iron supplements during pregnancy, but is not currently taking them. She was last transfused in 2016. No PMHx of Sickle Cell Anemia.         The history is provided by the patient. No  was used.     Review of patient's allergies indicates:  No Known Allergies  Past Medical History:   Diagnosis Date    Anemia     Encounter for blood transfusion      Past Surgical History:   Procedure Laterality Date     SECTION      mass left breast removed  2006    non-cancerous per pt.     TUBAL LIGATION       Family History   Problem Relation Age of Onset    Diabetes Maternal Grandmother     Hypertension Maternal Grandmother     Diabetes Paternal Grandmother     Hypertension Paternal Grandmother      Social History   Substance Use Topics    Smoking status: Never Smoker    Smokeless tobacco: Never Used    Alcohol use Yes      Comment: " Occasionally     Review of Systems   Constitutional: Negative for chills and fever.   HENT: Negative for sore throat.    Eyes: Negative for redness.   Respiratory: Negative for shortness of breath.    Cardiovascular: Negative for chest pain.   Gastrointestinal: Negative for abdominal pain, diarrhea, nausea and vomiting.   Genitourinary: Negative for dysuria.   Musculoskeletal: Negative for back pain.   Skin: Negative for wound.   Neurological: Positive for dizziness (on standing), syncope (loss of consciousness), light-headedness and headaches.       Physical Exam     Initial Vitals [07/20/17 1948]   BP Pulse Resp Temp SpO2   (!) 100/57 88 18 98.5 °F (36.9 °C) 100 %      MAP       71.33         Physical Exam    Nursing note and vitals reviewed.  Constitutional: She appears well-developed and well-nourished.  Non-toxic appearance. She does not appear ill.   HENT:   Head: Normocephalic and atraumatic.   Eyes: EOM are normal.   Pale conjunctivae.    Neck: Neck supple.   Cardiovascular: Normal rate and regular rhythm.   Pulmonary/Chest: Effort normal and breath sounds normal. No respiratory distress.   Abdominal: Soft. Normal appearance and bowel sounds are normal. She exhibits no distension. There is no tenderness.   Musculoskeletal: Normal range of motion.   Neurological: She is alert.   Skin: Skin is warm and dry.   Psychiatric: She has a normal mood and affect.         ED Course   Critical Care  Date/Time: 7/21/2017 12:56 AM  Performed by: CHRISTY MCFARLANE.  Authorized by: CHRISTY MCFARLANE   Direct patient critical care time: 10 minutes  Additional history critical care time: 10 minutes  Ordering / reviewing critical care time: 10 minutes  Documentation critical care time: 5 minutes  Consulting other physicians critical care time: 5 minutes  Total critical care time (exclusive of procedural time) : 40 minutes  Critical care was necessary to treat or prevent imminent or life-threatening deterioration of the following  conditions: shock.  Critical care was time spent personally by me on the following activities: pulse oximetry, re-evaluation of patient's condition, ordering and review of laboratory studies, ordering and performing treatments and interventions, development of treatment plan with patient or surrogate, examination of patient, obtaining history from patient or surrogate and evaluation of patient's response to treatment.        Labs Reviewed   CBC W/ AUTO DIFFERENTIAL - Abnormal; Notable for the following:        Result Value    RBC 3.64 (*)     Hemoglobin 6.6 (*)     Hematocrit 24.2 (*)     MCV 67 (*)     MCH 18.1 (*)     MCHC 27.3 (*)     RDW 18.9 (*)     Platelets 432 (*)     Platelet Estimate Increased (*)     All other components within normal limits   COMPREHENSIVE METABOLIC PANEL - Abnormal; Notable for the following:     Glucose 121 (*)     ALT 6 (*)     Anion Gap 7 (*)     All other components within normal limits   URINALYSIS - Abnormal; Notable for the following:     Urobilinogen, UA 2.0-3.0 (*)     All other components within normal limits   TROPONIN I   POCT URINE PREGNANCY   TYPE & SCREEN   PREPARE RBC SOFT     EKG Readings: (Independently Interpreted)   Rhythm: Normal Sinus Rhythm. Heart Rate: 76. ST Segments: Normal ST Segments. T Waves: Normal.          Medical Decision Making:   History:   Old Medical Records: I decided to obtain old medical records.  Clinical Tests:   Lab Tests: Ordered and Reviewed  Radiological Study: Reviewed and Ordered  Medical Tests: Ordered and Reviewed  ED Management:  Symptomatic anemia--transfuse and admit.            Scribe Attestation:   Scribe #1: I performed the above scribed service and the documentation accurately describes the services I performed. I attest to the accuracy of the note.    Attending Attestation:           Physician Attestation for Scribe:  Physician Attestation Statement for Scribe #1: I, Cb Bergman MD, reviewed documentation, as scribed by Viktor  Bella in my presence, and it is both accurate and complete.                 ED Course     Clinical Impression:   The primary encounter diagnosis was Syncope, unspecified syncope type. A diagnosis of Anemia, unspecified type was also pertinent to this visit.    Disposition:   Disposition: Admitted  Condition: Stable                        Cb Bergman MD  07/21/17 0057

## 2017-07-21 NOTE — H&P
"[facility] Medicine  History & Physical    Patient Name: Diana Diop  MRN: [id number]  Admission Date: 2017  Attending Physician: Lena Pineda MD   Primary Care Provider: KARLA Blanco         Patient information was obtained from patient and ER records.     Subjective:     Principal Problem: symptomatic anemia   Chief Complaint:   Chief Complaint   Patient presents with    Loss of Consciousness     "I blacked out today around 230p.  I felt myself getting dizzy so I sat myself down". Had a HA earlier and took an Advil. Hx of anemia.        HPI: Pt is 25 yo female with chronic anemia due to heavy menses and chronic headaches presented with report of syncope yesterday. She reports prior to LOC was dizziness, light-headed and headaches. She denies CP, palpitations, GIB,  SOB at rest but occasionally gets RODRIGUEZ. She has had multiple blood transfusions for her anemia and supposed to take iron supplements. She does not follow with GYN for heavy menses. Last menstrual cycle was two weeks ago. Last transfusion in 2016. No h/o sickle cell anemia or other blood disorders. On admit her h/h was 6.6/24.2.  She has received one unit of blood and h/h mike to 7.3/26.2.  She still has headaches to left side 7/10 intensity and lightheaded.     Past Medical History:   Diagnosis Date    Anemia     Encounter for blood transfusion        Past Surgical History:   Procedure Laterality Date     SECTION      mass left breast removed  2006    non-cancerous per pt.     TUBAL LIGATION         Review of patient's allergies indicates:  No Known Allergies    No current facility-administered medications on file prior to encounter.      Current Outpatient Prescriptions on File Prior to Encounter   Medication Sig    ferrous sulfate 325 mg (65 mg iron) Tab tablet Take 1 tablet (325 mg total) by mouth daily with breakfast.    benzonatate (TESSALON) 100 MG capsule Take 100 mg by " mouth 4 (four) times daily.    hydrocodone-acetaminophen 5-325mg (NORCO) 5-325 mg per tablet Take 1 tablet by mouth every 8 (eight) hours as needed for Pain.     Family History     Problem Relation (Age of Onset)    Diabetes Maternal Grandmother, Paternal Grandmother    Hypertension Maternal Grandmother, Paternal Grandmother        Social History Main Topics    Smoking status: Never Smoker    Smokeless tobacco: Never Used    Alcohol use Yes      Comment: Occasionally    Drug use:      Types: Marijuana      Comment: daily    Sexual activity: Yes     Partners: Male     Birth control/ protection: None     Review of Systems   Constitutional: Positive for fatigue.   Eyes: Negative.    Respiratory: Negative.    Cardiovascular: Negative.    Gastrointestinal: Negative.    Genitourinary: Positive for menstrual problem and vaginal bleeding.   Musculoskeletal: Negative.    Neurological: Positive for dizziness, light-headedness and headaches.   Hematological: Negative.    Psychiatric/Behavioral: Negative.      Objective:     Vital Signs (Most Recent):  Temp: 98 °F (36.7 °C) (07/21/17 0330)  Pulse: 65 (07/21/17 0330)  Resp: 18 (07/21/17 0330)  BP: 115/74 (07/21/17 0330)  SpO2: 100 % (07/21/17 0330) Vital Signs (24h Range):  Temp:  [97.5 °F (36.4 °C)-98.5 °F (36.9 °C)] 98 °F (36.7 °C)  Pulse:  [58-97] 65  Resp:  [16-18] 18  SpO2:  [100 %] 100 %  BP: (100-134)/(57-82) 115/74     Weight: 54.4 kg (120 lb)  Body mass index is 18.25 kg/m².    Physical Exam   Constitutional: She is oriented to person, place, and time. She appears well-developed and well-nourished. No distress.   HENT:   Head: Normocephalic and atraumatic.   Mouth/Throat: Oropharynx is clear and moist.   Eyes: EOM are normal.   Pale conjunctivae    Neck: Normal range of motion. Neck supple. No JVD present.   Cardiovascular: Normal rate, regular rhythm, normal heart sounds and intact distal pulses.    No murmur heard.  Pulmonary/Chest: Effort normal and breath  sounds normal. No respiratory distress.   Abdominal: Soft. Bowel sounds are normal. She exhibits no distension and no mass. There is no tenderness.   Musculoskeletal: Normal range of motion. She exhibits no edema or deformity.   Neurological: She is alert and oriented to person, place, and time.   Skin: Skin is warm and dry. Capillary refill takes less than 2 seconds.   Psychiatric: She has a normal mood and affect. Her behavior is normal.        Significant Labs:   CBC:   Recent Labs  Lab 07/20/17 2032 07/21/17  0526   WBC 4.04 5.08   HGB 6.6* 7.3*   HCT 24.2* 26.2*   * 353*     CMP:   Recent Labs  Lab 07/20/17 2032 07/21/17  0526    139   K 3.9 3.5    105   CO2 27 28   * 83   BUN 11 13   CREATININE 1.0 0.8   CALCIUM 9.7 9.1   PROT 8.0 6.8   ALBUMIN 4.1 3.6   BILITOT 0.7 1.4*   ALKPHOS 67 60   AST 15 37   ALT 6* 20   ANIONGAP 7* 6*   EGFRNONAA >60 >60     Coagulation: No results for input(s): INR, APTT in the last 48 hours.    Invalid input(s): PT  Urine Studies:   Recent Labs  Lab 07/20/17  2000   COLORU Yellow   APPEARANCEUA Clear   PHUR 7.0   SPECGRAV 1.020   PROTEINUA Negative   GLUCUA Negative   KETONESU Negative   BILIRUBINUA Negative   OCCULTUA Negative   NITRITE Negative   UROBILINOGEN 2.0-3.0*   LEUKOCYTESUR Negative       Significant Imaging: I have reviewed all pertinent imaging results/findings within the past 24 hours.    Assessment/Plan:     Chronic paroxysmal hemicrania, not intractable    Likely secondary to chronic anemia - head CT negative  Tylenol and norco given          Symptomatic anemia    Secondary to chronic blood loss/heavy menses  S/p one unit PRBCs  Transfuse today and repeat h/h  Needs GYN follow up          Syncope    Secondary to anemia   No cardiac issues  Head Ct without abnormalities  Monitor with ambulation                           VTE Risk Mitigation         Ordered     Medium Risk of VTE  Once      07/21/17 0348        Lena Pineda MD  Department  [facility] Medicine   [facility]

## 2017-07-21 NOTE — ED NOTES
Blood transfusion completed, tolerated transfusion well at this time. No complaints or concerns at this time. VS stable and recorded. Pt resting quietly, comfortable watching tv. Will continue to monitor.

## 2017-07-22 VITALS
DIASTOLIC BLOOD PRESSURE: 54 MMHG | SYSTOLIC BLOOD PRESSURE: 98 MMHG | TEMPERATURE: 99 F | HEIGHT: 68 IN | WEIGHT: 120 LBS | RESPIRATION RATE: 18 BRPM | BODY MASS INDEX: 18.19 KG/M2 | HEART RATE: 67 BPM | OXYGEN SATURATION: 100 %

## 2017-07-22 PROBLEM — R55 SYNCOPE: Status: RESOLVED | Noted: 2017-07-21 | Resolved: 2017-07-22

## 2017-07-22 LAB
ANISOCYTOSIS BLD QL SMEAR: SLIGHT
BASOPHILS # BLD AUTO: 0.03 K/UL
BASOPHILS NFR BLD: 0.7 %
DACRYOCYTES BLD QL SMEAR: ABNORMAL
DIFFERENTIAL METHOD: ABNORMAL
EOSINOPHIL # BLD AUTO: 0.1 K/UL
EOSINOPHIL NFR BLD: 3.2 %
ERYTHROCYTE [DISTWIDTH] IN BLOOD BY AUTOMATED COUNT: 22.3 %
HCT VFR BLD AUTO: 30.3 %
HGB BLD-MCNC: 9 G/DL
HYPOCHROMIA BLD QL SMEAR: ABNORMAL
LYMPHOCYTES # BLD AUTO: 1.7 K/UL
LYMPHOCYTES NFR BLD: 42.4 %
MCH RBC QN AUTO: 21.4 PG
MCHC RBC AUTO-ENTMCNC: 29.7 G/DL
MCV RBC AUTO: 72 FL
MONOCYTES # BLD AUTO: 0.6 K/UL
MONOCYTES NFR BLD: 13.5 %
NEUTROPHILS # BLD AUTO: 1.6 K/UL
NEUTROPHILS NFR BLD: 40.2 %
OVALOCYTES BLD QL SMEAR: ABNORMAL
PLATELET # BLD AUTO: 316 K/UL
PMV BLD AUTO: 10 FL
POIKILOCYTOSIS BLD QL SMEAR: SLIGHT
RBC # BLD AUTO: 4.2 M/UL
WBC # BLD AUTO: 4.06 K/UL

## 2017-07-22 PROCEDURE — 85025 COMPLETE CBC W/AUTO DIFF WBC: CPT

## 2017-07-22 PROCEDURE — 25000003 PHARM REV CODE 250: Performed by: EMERGENCY MEDICINE

## 2017-07-22 PROCEDURE — 25000003 PHARM REV CODE 250: Performed by: HOSPITALIST

## 2017-07-22 PROCEDURE — 36415 COLL VENOUS BLD VENIPUNCTURE: CPT

## 2017-07-22 RX ORDER — AMOXICILLIN 250 MG
1 CAPSULE ORAL 2 TIMES DAILY
COMMUNITY
Start: 2017-07-22 | End: 2019-01-01

## 2017-07-22 RX ADMIN — HYDROCODONE BITARTRATE AND ACETAMINOPHEN 1 TABLET: 10; 325 TABLET ORAL at 06:07

## 2017-07-22 RX ADMIN — SODIUM CHLORIDE, PRESERVATIVE FREE 3 ML: 5 INJECTION INTRAVENOUS at 05:07

## 2017-07-22 RX ADMIN — DOCUSATE SODIUM AND SENNOSIDES 1 TABLET: 8.6; 5 TABLET, FILM COATED ORAL at 09:07

## 2017-07-22 RX ADMIN — FERROUS SULFATE TAB EC 325 MG (65 MG FE EQUIVALENT) 325 MG: 325 (65 FE) TABLET DELAYED RESPONSE at 09:07

## 2017-07-22 NOTE — PLAN OF CARE
07/22/17 1442   Final Note   Assessment Type Final Discharge Note   Discharge Disposition Home   Discharge planning education complete? Yes   What phone number can be called within the next 1-3 days to see how you are doing after discharge? ([phone])   Hospital Follow Up  Appt(s) scheduled? Yes   Discharge plans and expectations educations in teach back method with documentation complete? Yes   Offered [facility] -- Bedside Delivery? n/a   Discharge/Hospital Encounter Summary to ([facility]) PCP n/a   Referral to Outpatient Case Management complete? n/a   Referral to / orders for Home Health Complete? n/a   30 day supply of medicines given at discharge, if documented non-compliance / non-adherence? n/a   Any social issues identified prior to discharge? No   Did you assess the readiness or willingness of the family or caregiver to support self management of care? n/a   Right Care Referral Info   Post Acute Recommendation No Care

## 2017-07-22 NOTE — HOSPITAL COURSE
Pt admitted with symptomatic anemia and syncope secondary to anemia. She has heavy menses and likely cause of her chronic blood loss anemia. Blood had already been given to get iron studies but she will continue on iron supplementation. She follows at [facility] and will need referral to GYN. Headaches improved and dizziness resolved. Her H/H on dc home 9.0/30.3.

## 2017-07-22 NOTE — ASSESSMENT & PLAN NOTE
Secondary to chronic blood loss/heavy menses  S/p two units PRBCs  H/h on dc home 9.0/30.3  Needs GYN follow up

## 2017-07-22 NOTE — PROGRESS NOTES
Patient is discharged. Discharge instructions given to patient. No prescriptions for patient. Can get iron medicine and stool softner over the counter. Patient understands this. Saline lock x 2 removed. Left unit , ambulatory with family.

## 2017-07-22 NOTE — DISCHARGE SUMMARY
[facility]  Discharge Summary      Patient Name: Diana Diop  MRN: [id number]  Admission Date: 7/20/2017  Hospital Length of Stay: 1 days  Discharge Date and Time:  07/22/2017 9:57 AM  Attending Physician: Lena Pineda MD   Discharging Provider: Lena Pineda MD  Primary Care Provider: KARLA Blanco      HPI:   Pt is 27 yo female with chronic anemia due to heavy menses and chronic headaches presented with report of syncope yesterday. She reports prior to LOC was dizziness, light-headed and headaches. She denies CP, palpitations, GIB,  SOB at rest but occasionally gets RODRIGUEZ. She has had multiple blood transfusions for her anemia and supposed to take iron supplements. She does not follow with GYN for heavy menses. Last menstrual cycle was two weeks ago. Last transfusion in June 2016. No h/o sickle cell anemia or other blood disorders. On admit her h/h was 6.6/24.2.  She has received one unit of blood and h/h mike to 7.3/26.2.  She still has headaches to left side 7/10 intensity and lightheaded.     * No surgery found *      Indwelling Lines/Drains at time of discharge:   Lines/Drains/Airways          No matching active lines, drains, or airways        Hospital Course:   Pt admitted with symptomatic anemia and syncope secondary to anemia. She has heavy menses and likely cause of her chronic blood loss anemia. Blood had already been given to get iron studies but she will continue on iron supplementation. She follows at [facility] and will need referral to GYN. Headaches improved and dizziness resolved. Her H/H on dc home 9.0/30.3.      Consults:         Pending Diagnostic Studies:     None        Final Active Diagnoses:    Diagnosis Date Noted POA    Syncope [R55] 07/21/2017 Yes    Symptomatic anemia [D64.9] 07/21/2017 Yes    Chronic paroxysmal hemicrania, not intractable [G44.049] 07/21/2017 Yes      Problems Resolved During this Admission:    Diagnosis  Date Noted Date Resolved POA      Chronic paroxysmal hemicrania, not intractable    Likely secondary to chronic anemia - head CT negative  Tylenol and norco given          Symptomatic anemia    Secondary to chronic blood loss/heavy menses  S/p two units PRBCs  H/h on dc home 9.0/30.3  Needs GYN follow up          Syncope    Secondary to anemia   No cardiac issues  Head Ct without abnormalities                              Discharged Condition: good    Disposition:     Follow Up:  Follow-up Information     [facility] On 7/27/2017.    Why:  Outpatient Services, PCP follow-up appointment. Patient should arrive by 1:30PM.   Contact information:  [address]  [facility] [id number] [phone]                 Patient Instructions:   No discharge procedures on file.  Medications:  Reconciled Home Medications:   Current Discharge Medication List      CONTINUE these medications which have NOT CHANGED    Details   ferrous sulfate 325 mg (65 mg iron) Tab tablet Take 1 tablet (325 mg total) by mouth daily with breakfast.  Qty: 90 tablet, Refills: 0             hydrocodone-acetaminophen 5-325mg (NORCO) 5-325 mg per tablet Take 1 tablet by mouth every 8 (eight) hours as needed for Pain.  Qty: 12 tablet, Refills: 0           Time spent on the discharge of patient: 30 minutes    Lena Pineda MD  Department of Hospital Medicine  [facility]

## 2017-09-05 ENCOUNTER — HOSPITAL ENCOUNTER (EMERGENCY)
Facility: HOSPITAL | Age: 27
Discharge: HOME | End: 2017-09-05
Attending: EMERGENCY MEDICINE
Payer: MEDICAID

## 2017-09-05 VITALS
RESPIRATION RATE: 19 BRPM | HEIGHT: 68 IN | BODY MASS INDEX: 18.19 KG/M2 | TEMPERATURE: 98 F | WEIGHT: 120 LBS | HEART RATE: 65 BPM | DIASTOLIC BLOOD PRESSURE: 80 MMHG | OXYGEN SATURATION: 99 % | SYSTOLIC BLOOD PRESSURE: 129 MMHG

## 2017-09-05 DIAGNOSIS — R05.9 COUGH: ICD-10-CM

## 2017-09-05 DIAGNOSIS — M62.830 BACK SPASM: Primary | ICD-10-CM

## 2017-09-05 PROCEDURE — 99283 EMERGENCY DEPT VISIT LOW MDM: CPT

## 2017-09-05 RX ORDER — LORATADINE 10 MG/1
10 TABLET ORAL DAILY
COMMUNITY
End: 2019-01-01

## 2017-09-05 RX ORDER — GUAIFENESIN/DEXTROMETHORPHAN 100-10MG/5
5 SYRUP ORAL 4 TIMES DAILY PRN
Qty: 120 ML | Refills: 0 | COMMUNITY
Start: 2017-09-05 | End: 2017-09-15

## 2017-09-05 NOTE — ED PROVIDER NOTES
"Encounter Date: 2017       History     Chief Complaint   Patient presents with    Cough     "I cough a bad cough attack and now my back hurts me" prod yellow cough x 4 days. Unknown fever     27-year-old female presents emergency Department with coughing that created a spasm in her back.  She was seen by her nurse practitioner 2 weeks ago and prescribed Zyrtec, which is helped slightly but she has continued to have a faint cough.  She leaned down to  her son, and was coughing when she leaned down, and has had a back spasm over her upper thoracic region since.  She has no shortness of breath she has no leg swelling.  She has not taken any medications for this.       The history is provided by the patient.     Review of patient's allergies indicates:  No Known Allergies  Past Medical History:   Diagnosis Date    Anemia     Encounter for blood transfusion      Past Surgical History:   Procedure Laterality Date     SECTION      mass left breast removed  2006    non-cancerous per pt.     TUBAL LIGATION       Family History   Problem Relation Age of Onset    Diabetes Maternal Grandmother     Hypertension Maternal Grandmother     Diabetes Paternal Grandmother     Hypertension Paternal Grandmother      Social History   Substance Use Topics    Smoking status: Never Smoker    Smokeless tobacco: Never Used    Alcohol use Yes      Comment: Occasionally     Review of Systems   Eyes: Negative.    Endocrine: Negative.    Genitourinary: Negative.    All other systems reviewed and are negative.      Physical Exam     Initial Vitals [17 1120]   BP Pulse Resp Temp SpO2   129/80 65 19 98.3 °F (36.8 °C) 99 %      MAP       96.33         Physical Exam    Nursing note and vitals reviewed.  Constitutional: She appears well-developed and well-nourished. She appears distressed.   HENT:   Head: Normocephalic and atraumatic.   Eyes: EOM are normal. Pupils are equal, round, and reactive to light.   Neck: " Normal range of motion. Neck supple.   Cardiovascular: Normal rate.   Pulmonary/Chest: No respiratory distress.   Her lungs are clear to auscultation bilaterally she has no wheezing or rhonchi   Abdominal: Soft.   Musculoskeletal: Normal range of motion.   She has some tenderness in her left upper scapula, and along her posterior ribs   Neurological: She is alert and oriented to person, place, and time. She has normal strength. No cranial nerve deficit.   Skin: Skin is warm and dry.   Psychiatric: She has a normal mood and affect.         ED Course   Procedures  Labs Reviewed - No data to display       X-Rays:   Independently Interpreted Readings:   Other Readings:  Chest Xray was independently reviewed by me; I agree with radiologist interpretation.       Medical Decision Making:   Initial Assessment:   A very well-appearing 27-year-old female with 2 weeks worth of a dry cough, here with pain over her posterior back from coughing, as well as from leaning down to  her son  Differential Diagnosis:   Most likely back spasm, possible pneumonia given 2 weeks' duration, possible pneumothorax  ED Management:  Chest x-ray is clear, patient's vital signs continued to be stable.  She was given a prescription to help with her cough, and given return precautions.    Patient was counseled on reasons to return to the ED and expressed understanding, patient was discharged in stable condition with appropriate outpatient follow up plan.                      ED Course as of Sep 05 1343   Tue Sep 05, 2017   1343 CXR w/o pneumothorax, no acute process  [MG]      ED Course User Index  [MG] Missy Mckenna MD     Clinical Impression:   The primary encounter diagnosis was Back spasm. A diagnosis of Cough was also pertinent to this visit.                           Missy Mckenna MD  09/05/17 4082

## 2017-10-13 ENCOUNTER — HOSPITAL ENCOUNTER (EMERGENCY)
Facility: HOSPITAL | Age: 27
Discharge: HOME | End: 2017-10-13
Attending: FAMILY MEDICINE
Payer: MEDICAID

## 2017-10-13 VITALS
DIASTOLIC BLOOD PRESSURE: 67 MMHG | SYSTOLIC BLOOD PRESSURE: 104 MMHG | HEART RATE: 79 BPM | BODY MASS INDEX: 19.01 KG/M2 | WEIGHT: 125 LBS | RESPIRATION RATE: 18 BRPM | OXYGEN SATURATION: 99 % | TEMPERATURE: 97 F

## 2017-10-13 DIAGNOSIS — K52.9 GASTROENTERITIS: Primary | ICD-10-CM

## 2017-10-13 LAB
ALBUMIN SERPL BCP-MCNC: 4.8 G/DL
ALP SERPL-CCNC: 81 U/L
ALT SERPL W/O P-5'-P-CCNC: 37 U/L
ANION GAP SERPL CALC-SCNC: 17 MMOL/L
AST SERPL-CCNC: 33 U/L
BACTERIA #/AREA URNS AUTO: ABNORMAL /HPF
BASOPHILS # BLD AUTO: 0.01 K/UL
BASOPHILS NFR BLD: 0.1 %
BILIRUB SERPL-MCNC: 1.4 MG/DL
BILIRUB UR QL STRIP: NEGATIVE
BUN SERPL-MCNC: 14 MG/DL
CALCIUM SERPL-MCNC: 9.9 MG/DL
CHLORIDE SERPL-SCNC: 107 MMOL/L
CLARITY UR REFRACT.AUTO: CLEAR
CO2 SERPL-SCNC: 20 MMOL/L
COLOR UR AUTO: YELLOW
CREAT SERPL-MCNC: 0.8 MG/DL
DIFFERENTIAL METHOD: ABNORMAL
EOSINOPHIL # BLD AUTO: 0 K/UL
EOSINOPHIL NFR BLD: 0.2 %
ERYTHROCYTE [DISTWIDTH] IN BLOOD BY AUTOMATED COUNT: 18.2 %
EST. GFR  (AFRICAN AMERICAN): >60 ML/MIN/1.73 M^2
EST. GFR  (NON AFRICAN AMERICAN): >60 ML/MIN/1.73 M^2
GLUCOSE SERPL-MCNC: 172 MG/DL
GLUCOSE UR QL STRIP: NEGATIVE
HCT VFR BLD AUTO: 39.8 %
HGB BLD-MCNC: 12.6 G/DL
HGB UR QL STRIP: NEGATIVE
HYALINE CASTS UR QL AUTO: 0 /LPF
KETONES UR QL STRIP: ABNORMAL
LEUKOCYTE ESTERASE UR QL STRIP: NEGATIVE
LIPASE SERPL-CCNC: 67 U/L
LYMPHOCYTES # BLD AUTO: 0.8 K/UL
LYMPHOCYTES NFR BLD: 7.1 %
MCH RBC QN AUTO: 24.8 PG
MCHC RBC AUTO-ENTMCNC: 31.7 G/DL
MCV RBC AUTO: 78 FL
MICROSCOPIC COMMENT: ABNORMAL
MONOCYTES # BLD AUTO: 0.6 K/UL
MONOCYTES NFR BLD: 5.2 %
NEUTROPHILS # BLD AUTO: 9.6 K/UL
NEUTROPHILS NFR BLD: 87.1 %
NITRITE UR QL STRIP: NEGATIVE
PH UR STRIP: 8 [PH] (ref 5–8)
PLATELET # BLD AUTO: 310 K/UL
PMV BLD AUTO: 10.5 FL
POTASSIUM SERPL-SCNC: 3.6 MMOL/L
PROT SERPL-MCNC: 8.2 G/DL
PROT UR QL STRIP: ABNORMAL
RBC # BLD AUTO: 5.09 M/UL
RBC #/AREA URNS AUTO: 2 /HPF (ref 0–4)
SODIUM SERPL-SCNC: 144 MMOL/L
SP GR UR STRIP: 1.01 (ref 1–1.03)
URN SPEC COLLECT METH UR: ABNORMAL
UROBILINOGEN UR STRIP-ACNC: NEGATIVE EU/DL
WBC # BLD AUTO: 11.02 K/UL
WBC #/AREA URNS AUTO: 4 /HPF (ref 0–5)

## 2017-10-13 PROCEDURE — 83690 ASSAY OF LIPASE: CPT

## 2017-10-13 PROCEDURE — 25000003 PHARM REV CODE 250: Performed by: FAMILY MEDICINE

## 2017-10-13 PROCEDURE — 63600175 PHARM REV CODE 636 W HCPCS: Performed by: FAMILY MEDICINE

## 2017-10-13 PROCEDURE — 96374 THER/PROPH/DIAG INJ IV PUSH: CPT

## 2017-10-13 PROCEDURE — 81000 URINALYSIS NONAUTO W/SCOPE: CPT

## 2017-10-13 PROCEDURE — 96375 TX/PRO/DX INJ NEW DRUG ADDON: CPT

## 2017-10-13 PROCEDURE — 25500020 PHARM REV CODE 255: Performed by: FAMILY MEDICINE

## 2017-10-13 PROCEDURE — 99284 EMERGENCY DEPT VISIT MOD MDM: CPT | Mod: 25,27

## 2017-10-13 PROCEDURE — 96361 HYDRATE IV INFUSION ADD-ON: CPT

## 2017-10-13 PROCEDURE — 80053 COMPREHEN METABOLIC PANEL: CPT

## 2017-10-13 PROCEDURE — 85025 COMPLETE CBC W/AUTO DIFF WBC: CPT

## 2017-10-13 RX ORDER — ONDANSETRON 2 MG/ML
8 INJECTION INTRAMUSCULAR; INTRAVENOUS
Status: COMPLETED | OUTPATIENT
Start: 2017-10-13 | End: 2017-10-13

## 2017-10-13 RX ORDER — PROCHLORPERAZINE MALEATE 10 MG
10 TABLET ORAL EVERY 6 HOURS PRN
Qty: 15 TABLET | Refills: 0 | Status: SHIPPED | OUTPATIENT
Start: 2017-10-13 | End: 2019-01-01

## 2017-10-13 RX ORDER — METOCLOPRAMIDE HYDROCHLORIDE 5 MG/ML
10 INJECTION INTRAMUSCULAR; INTRAVENOUS
Status: COMPLETED | OUTPATIENT
Start: 2017-10-13 | End: 2017-10-13

## 2017-10-13 RX ADMIN — IOHEXOL 90 ML: 350 INJECTION, SOLUTION INTRAVENOUS at 12:10

## 2017-10-13 RX ADMIN — ONDANSETRON 8 MG: 2 INJECTION INTRAMUSCULAR; INTRAVENOUS at 11:10

## 2017-10-13 RX ADMIN — SODIUM CHLORIDE 1000 ML: 0.9 INJECTION, SOLUTION INTRAVENOUS at 11:10

## 2017-10-13 RX ADMIN — METOCLOPRAMIDE 10 MG: 5 INJECTION, SOLUTION INTRAMUSCULAR; INTRAVENOUS at 12:10

## 2017-10-13 NOTE — ED PROVIDER NOTES
Encounter Date: 10/13/2017       History     Chief Complaint   Patient presents with    Emesis     Pt with complaints of N/V/D began this am     The history is provided by the patient.   Abdominal Pain   The current episode started yesterday. The onset of the illness was gradual. The abdominal pain is generalized. The abdominal pain does not radiate. The abdominal pain is relieved by nothing. The other symptoms of the illness include nausea and vomiting. The other symptoms of the illness do not include fever, shortness of breath, diarrhea or dysuria.   Nausea began yesterday. The nausea is associated with eating. The nausea is exacerbated by food.   The vomiting began yesterday. Vomiting occurs 2 to 5 times per day. The emesis contains stomach contents.   The patient states that she believes she is currently not pregnant. The patient has not had a change in bowel habit. Symptoms associated with the illness do not include chills or back pain.     Review of patient's allergies indicates:  No Known Allergies  Past Medical History:   Diagnosis Date    Anemia     Encounter for blood transfusion      Past Surgical History:   Procedure Laterality Date     SECTION      mass left breast removed  2006    non-cancerous per pt.     TUBAL LIGATION       Family History   Problem Relation Age of Onset    Diabetes Maternal Grandmother     Hypertension Maternal Grandmother     Diabetes Paternal Grandmother     Hypertension Paternal Grandmother      Social History   Substance Use Topics    Smoking status: Never Smoker    Smokeless tobacco: Never Used    Alcohol use Yes      Comment: Occasionally     Review of Systems   Constitutional: Negative for chills and fever.   HENT: Negative for sore throat.    Respiratory: Negative for shortness of breath.    Cardiovascular: Negative for chest pain.   Gastrointestinal: Positive for abdominal pain, nausea and vomiting. Negative for diarrhea.   Genitourinary: Negative  for dysuria.   Musculoskeletal: Negative for back pain.   Skin: Negative for rash.   Neurological: Negative for weakness.   Hematological: Does not bruise/bleed easily.   All other systems reviewed and are negative.      Physical Exam     Initial Vitals   BP Pulse Resp Temp SpO2   10/13/17 1133 10/13/17 1133 10/13/17 1133 10/13/17 1133 10/13/17 1225   109/69 78 20 98.5 °F (36.9 °C) 100 %      MAP       10/13/17 1133       82.33         Physical Exam    Nursing note and vitals reviewed.  Constitutional: She appears well-developed.   HENT:   Head: Normocephalic and atraumatic.   Right Ear: External ear normal.   Left Ear: External ear normal.   Nose: Nose normal.   Mouth/Throat: Oropharynx is clear and moist.   Eyes: Conjunctivae and EOM are normal. Pupils are equal, round, and reactive to light. Right eye exhibits no discharge. Left eye exhibits no discharge.   Neck: Normal range of motion. Neck supple. No tracheal deviation present.   Cardiovascular: Normal rate, regular rhythm and normal heart sounds.   No murmur heard.  Pulmonary/Chest: Breath sounds normal. No respiratory distress. She has no wheezes.   Abdominal: Soft. She exhibits no distension and no mass. There is no tenderness. There is no rebound and no guarding.   Hyperactive bowel sounds   Neurological: She is alert and oriented to person, place, and time.   Skin: Skin is warm and dry.         ED Course   Procedures  Labs Reviewed   CBC W/ AUTO DIFFERENTIAL - Abnormal; Notable for the following:        Result Value    MCV 78 (*)     MCH 24.8 (*)     MCHC 31.7 (*)     RDW 18.2 (*)     Gran # 9.6 (*)     Lymph # 0.8 (*)     Gran% 87.1 (*)     Lymph% 7.1 (*)     All other components within normal limits   COMPREHENSIVE METABOLIC PANEL - Abnormal; Notable for the following:     CO2 20 (*)     Glucose 172 (*)     Total Bilirubin 1.4 (*)     Anion Gap 17 (*)     All other components within normal limits   LIPASE   URINALYSIS             Medical Decision Making:    Initial Assessment:   Patient is laying in bed vomiting  Differential Diagnosis:   Appendicitis , constipation, diverticulitis, colitis, gastroenteritis                     ED Course      Clinical Impression:   The encounter diagnosis was Gastroenteritis.                           Tyler Dotson MD  10/13/17 8235

## 2017-10-14 ENCOUNTER — HOSPITAL ENCOUNTER (EMERGENCY)
Facility: HOSPITAL | Age: 27
Discharge: HOME | End: 2017-10-14
Attending: FAMILY MEDICINE
Payer: MEDICAID

## 2017-10-14 VITALS
BODY MASS INDEX: 19.62 KG/M2 | RESPIRATION RATE: 18 BRPM | DIASTOLIC BLOOD PRESSURE: 72 MMHG | WEIGHT: 125 LBS | OXYGEN SATURATION: 99 % | HEIGHT: 67 IN | SYSTOLIC BLOOD PRESSURE: 134 MMHG | TEMPERATURE: 98 F | HEART RATE: 87 BPM

## 2017-10-14 DIAGNOSIS — K52.9 GASTROENTERITIS, ACUTE: Primary | ICD-10-CM

## 2017-10-14 DIAGNOSIS — K85.90 PANCREATITIS, ACUTE: ICD-10-CM

## 2017-10-14 LAB
ALBUMIN SERPL BCP-MCNC: 4.8 G/DL
ALP SERPL-CCNC: 73 U/L
ALT SERPL W/O P-5'-P-CCNC: 41 U/L
AMPHET+METHAMPHET UR QL: NEGATIVE
AMYLASE SERPL-CCNC: 286 U/L
ANION GAP SERPL CALC-SCNC: 15 MMOL/L
AST SERPL-CCNC: 40 U/L
B-HCG UR QL: NEGATIVE
BARBITURATES UR QL SCN>200 NG/ML: NEGATIVE
BASOPHILS # BLD AUTO: 0.01 K/UL
BASOPHILS NFR BLD: 0.1 %
BENZODIAZ UR QL SCN>200 NG/ML: NEGATIVE
BILIRUB SERPL-MCNC: 1.6 MG/DL
BUN SERPL-MCNC: 14 MG/DL
BZE UR QL SCN: NEGATIVE
CALCIUM SERPL-MCNC: 10 MG/DL
CANNABINOIDS UR QL SCN: ABNORMAL
CHLORIDE SERPL-SCNC: 106 MMOL/L
CO2 SERPL-SCNC: 21 MMOL/L
CREAT SERPL-MCNC: 0.75 MG/DL
CREAT UR-MCNC: >346.5 MG/DL
DIFFERENTIAL METHOD: ABNORMAL
EOSINOPHIL # BLD AUTO: 0 K/UL
EOSINOPHIL NFR BLD: 0 %
ERYTHROCYTE [DISTWIDTH] IN BLOOD BY AUTOMATED COUNT: 18.2 %
EST. GFR  (AFRICAN AMERICAN): >60 ML/MIN/1.73 M^2
EST. GFR  (NON AFRICAN AMERICAN): >60 ML/MIN/1.73 M^2
GLUCOSE SERPL-MCNC: 107 MG/DL
HCT VFR BLD AUTO: 37.1 %
HGB BLD-MCNC: 11.9 G/DL
LIPASE SERPL-CCNC: 876 U/L
LYMPHOCYTES # BLD AUTO: 0.8 K/UL
LYMPHOCYTES NFR BLD: 8.2 %
MCH RBC QN AUTO: 24.9 PG
MCHC RBC AUTO-ENTMCNC: 32.1 G/DL
MCV RBC AUTO: 78 FL
METHADONE UR QL SCN>300 NG/ML: NEGATIVE
MONOCYTES # BLD AUTO: 0.7 K/UL
MONOCYTES NFR BLD: 7.7 %
NEUTROPHILS # BLD AUTO: 8.1 K/UL
NEUTROPHILS NFR BLD: 83.8 %
OPIATES UR QL SCN: NEGATIVE
PCP UR QL SCN>25 NG/ML: NEGATIVE
PLATELET # BLD AUTO: 258 K/UL
PMV BLD AUTO: 10.3 FL
POTASSIUM SERPL-SCNC: 3 MMOL/L
PROT SERPL-MCNC: 8.2 G/DL
RBC # BLD AUTO: 4.78 M/UL
SODIUM SERPL-SCNC: 142 MMOL/L
TOXICOLOGY INFORMATION: ABNORMAL
WBC # BLD AUTO: 9.66 K/UL

## 2017-10-14 PROCEDURE — 96374 THER/PROPH/DIAG INJ IV PUSH: CPT

## 2017-10-14 PROCEDURE — 25000003 PHARM REV CODE 250: Performed by: FAMILY MEDICINE

## 2017-10-14 PROCEDURE — 99284 EMERGENCY DEPT VISIT MOD MDM: CPT | Mod: 25

## 2017-10-14 PROCEDURE — 96375 TX/PRO/DX INJ NEW DRUG ADDON: CPT

## 2017-10-14 PROCEDURE — 80053 COMPREHEN METABOLIC PANEL: CPT

## 2017-10-14 PROCEDURE — 96361 HYDRATE IV INFUSION ADD-ON: CPT

## 2017-10-14 PROCEDURE — 81025 URINE PREGNANCY TEST: CPT

## 2017-10-14 PROCEDURE — 63600175 PHARM REV CODE 636 W HCPCS: Performed by: FAMILY MEDICINE

## 2017-10-14 PROCEDURE — 80307 DRUG TEST PRSMV CHEM ANLYZR: CPT

## 2017-10-14 PROCEDURE — 25000003 PHARM REV CODE 250: Performed by: EMERGENCY MEDICINE

## 2017-10-14 PROCEDURE — 82150 ASSAY OF AMYLASE: CPT

## 2017-10-14 PROCEDURE — C9113 INJ PANTOPRAZOLE SODIUM, VIA: HCPCS | Performed by: FAMILY MEDICINE

## 2017-10-14 PROCEDURE — 85025 COMPLETE CBC W/AUTO DIFF WBC: CPT

## 2017-10-14 PROCEDURE — 83690 ASSAY OF LIPASE: CPT

## 2017-10-14 RX ORDER — PROMETHAZINE HYDROCHLORIDE 25 MG/1
25 SUPPOSITORY RECTAL EVERY 6 HOURS PRN
Qty: 10 SUPPOSITORY | Refills: 0 | Status: SHIPPED | OUTPATIENT
Start: 2017-10-14 | End: 2017-10-14

## 2017-10-14 RX ORDER — POTASSIUM CHLORIDE 20 MEQ/1
60 TABLET, EXTENDED RELEASE ORAL
Status: COMPLETED | OUTPATIENT
Start: 2017-10-14 | End: 2017-10-14

## 2017-10-14 RX ORDER — PROMETHAZINE HYDROCHLORIDE 25 MG/1
25 SUPPOSITORY RECTAL EVERY 6 HOURS PRN
Qty: 10 SUPPOSITORY | Refills: 0 | Status: SHIPPED | OUTPATIENT
Start: 2017-10-14 | End: 2023-10-04

## 2017-10-14 RX ORDER — ONDANSETRON 2 MG/ML
8 INJECTION INTRAMUSCULAR; INTRAVENOUS
Status: COMPLETED | OUTPATIENT
Start: 2017-10-14 | End: 2017-10-14

## 2017-10-14 RX ORDER — METOCLOPRAMIDE HYDROCHLORIDE 5 MG/ML
10 INJECTION INTRAMUSCULAR; INTRAVENOUS
Status: COMPLETED | OUTPATIENT
Start: 2017-10-14 | End: 2017-10-14

## 2017-10-14 RX ORDER — PANTOPRAZOLE SODIUM 40 MG/10ML
40 INJECTION, POWDER, LYOPHILIZED, FOR SOLUTION INTRAVENOUS
Status: COMPLETED | OUTPATIENT
Start: 2017-10-14 | End: 2017-10-14

## 2017-10-14 RX ADMIN — SODIUM CHLORIDE 1000 ML: 0.9 INJECTION, SOLUTION INTRAVENOUS at 11:10

## 2017-10-14 RX ADMIN — ONDANSETRON 8 MG: 2 INJECTION INTRAMUSCULAR; INTRAVENOUS at 10:10

## 2017-10-14 RX ADMIN — POTASSIUM CHLORIDE 60 MEQ: 20 TABLET, EXTENDED RELEASE ORAL at 03:10

## 2017-10-14 RX ADMIN — PANTOPRAZOLE SODIUM 40 MG: 40 INJECTION, POWDER, FOR SOLUTION INTRAVENOUS at 10:10

## 2017-10-14 RX ADMIN — METOCLOPRAMIDE 10 MG: 5 INJECTION, SOLUTION INTRAMUSCULAR; INTRAVENOUS at 10:10

## 2017-10-14 NOTE — ED PROVIDER NOTES
Encounter Date: 10/14/2017       History     Chief Complaint   Patient presents with    Vomiting     Pt states she cannot stop vomiting. Pt was seen here twice yesterday. No relief with Zofran. Pt admits to having dirrhea yesterday but not today     Patient presented to the ER yesterday twice for nausea vomiting and diarrhea.  Patient says she has improved from diarrhea but she is unable to tolerate anything by mouth.  His morning she woke up and her nausea was better so she came to the ER for evaluation.  Patient denies any fever.  No blood in the vomiting.  Patient denies drinking any alcohol.      The history is provided by the patient.     Review of patient's allergies indicates:  No Known Allergies  Past Medical History:   Diagnosis Date    Anemia     Encounter for blood transfusion      Past Surgical History:   Procedure Laterality Date     SECTION      mass left breast removed  2006    non-cancerous per pt.     TUBAL LIGATION       Family History   Problem Relation Age of Onset    Diabetes Maternal Grandmother     Hypertension Maternal Grandmother     Diabetes Paternal Grandmother     Hypertension Paternal Grandmother      Social History   Substance Use Topics    Smoking status: Never Smoker    Smokeless tobacco: Never Used    Alcohol use Yes      Comment: Occasionally     Review of Systems   Constitutional: Negative for activity change, appetite change, chills and fever.   HENT: Negative for congestion, ear discharge, rhinorrhea and sore throat.    Eyes: Negative for photophobia, pain, discharge, redness and itching.   Respiratory: Negative for cough, chest tightness, shortness of breath and wheezing.    Gastrointestinal: Positive for abdominal pain, nausea and vomiting.   Genitourinary: Negative for dysuria.   Musculoskeletal: Negative for back pain, gait problem, myalgias and neck pain.   Skin: Negative for rash.   Neurological: Negative for dizziness, light-headedness and  headaches.   Psychiatric/Behavioral: Negative for behavioral problems and hallucinations. The patient is not nervous/anxious.    All other systems reviewed and are negative.      Physical Exam     Initial Vitals [10/14/17 1001]   BP Pulse Resp Temp SpO2   128/76 (!) 57 16 98.9 °F (37.2 °C) 100 %      MAP       93.33         Physical Exam    Nursing note and vitals reviewed.  Constitutional: She appears well-developed and well-nourished.   HENT:   Head: Normocephalic.   Right Ear: External ear normal.   Left Ear: External ear normal.   Nose: Nose normal.   Mouth/Throat: Oropharynx is clear and moist.   Eyes: Conjunctivae and EOM are normal. Pupils are equal, round, and reactive to light.   Neck: Normal range of motion. Neck supple.   Cardiovascular: Normal rate, regular rhythm, normal heart sounds and intact distal pulses. Exam reveals no gallop and no friction rub.    No murmur heard.  Pulmonary/Chest: Breath sounds normal. No respiratory distress. She has no wheezes. She has no rhonchi. She has no rales. She exhibits no tenderness.   Abdominal: Soft. Bowel sounds are normal. She exhibits no distension and no mass. There is no tenderness. There is no guarding.   Musculoskeletal: Normal range of motion.   Neurological: She is alert and oriented to person, place, and time. She has normal strength and normal reflexes. She displays normal reflexes. No cranial nerve deficit or sensory deficit.   Skin: Skin is warm. Capillary refill takes less than 2 seconds. No erythema.         ED Course   Procedures  Labs Reviewed - No data to display          Medical Decision Making:   Clinical Tests:   Lab Tests: Ordered and Reviewed  Radiological Study: Ordered and Reviewed  ED Management:  Patient had abdominal pain workup yesterday which turned out to be negative except for possible viral infection.  A repeat workup again today did not reveal any abnormalities.  Patient had a CT scan yesterday which was negative ultrasound today  is negative                   ED Course      Clinical Impression:   The primary encounter diagnosis was Gastroenteritis, acute. A diagnosis of Pancreatitis, acute was also pertinent to this visit.    Disposition:   Disposition: Discharged  Condition: Marlene Parr MD  10/14/17 9439

## 2017-10-14 NOTE — ED TRIAGE NOTES
Pt states she cannot stop vomiting. Pt was seen here twice yesterday. No relief with Zofran. Pt admits to having dirrhea yesterday but not today

## 2017-10-17 ENCOUNTER — HOSPITAL ENCOUNTER (INPATIENT)
Facility: HOSPITAL | Age: 27
LOS: 1 days | Discharge: HOME | DRG: 392 | End: 2017-10-19
Attending: EMERGENCY MEDICINE | Admitting: FAMILY MEDICINE
Payer: MEDICAID

## 2017-10-17 DIAGNOSIS — R53.1 WEAKNESS: ICD-10-CM

## 2017-10-17 DIAGNOSIS — R11.2 INTRACTABLE VOMITING WITH NAUSEA, UNSPECIFIED VOMITING TYPE: ICD-10-CM

## 2017-10-17 DIAGNOSIS — E87.6 HYPOKALEMIA: ICD-10-CM

## 2017-10-17 DIAGNOSIS — E86.0 DEHYDRATION: Primary | ICD-10-CM

## 2017-10-17 DIAGNOSIS — R11.2 NAUSEA AND VOMITING, INTRACTABILITY OF VOMITING NOT SPECIFIED, UNSPECIFIED VOMITING TYPE: ICD-10-CM

## 2017-10-17 DIAGNOSIS — K90.49 GASTROINTESTINAL INTOLERANCE TO FOODS: ICD-10-CM

## 2017-10-17 LAB
ALBUMIN SERPL BCP-MCNC: 5.2 G/DL
ALP SERPL-CCNC: 83 U/L
ALT SERPL W/O P-5'-P-CCNC: 49 U/L
AMORPH CRY UR QL COMP ASSIST: ABNORMAL
AMYLASE SERPL-CCNC: 173 U/L
ANION GAP SERPL CALC-SCNC: 18 MMOL/L
AST SERPL-CCNC: 31 U/L
BACTERIA #/AREA URNS AUTO: ABNORMAL /HPF
BASOPHILS # BLD AUTO: 0.04 K/UL
BASOPHILS NFR BLD: 0.6 %
BILIRUB SERPL-MCNC: 1.6 MG/DL
BILIRUB UR QL STRIP: ABNORMAL
BUN SERPL-MCNC: 10 MG/DL
CALCIUM SERPL-MCNC: 10.3 MG/DL
CHLORIDE SERPL-SCNC: 103 MMOL/L
CLARITY UR REFRACT.AUTO: CLEAR
CO2 SERPL-SCNC: 20 MMOL/L
COLOR UR AUTO: ABNORMAL
CREAT SERPL-MCNC: 0.69 MG/DL
DIFFERENTIAL METHOD: ABNORMAL
EOSINOPHIL # BLD AUTO: 0 K/UL
EOSINOPHIL NFR BLD: 0.3 %
ERYTHROCYTE [DISTWIDTH] IN BLOOD BY AUTOMATED COUNT: 16.3 %
EST. GFR  (AFRICAN AMERICAN): >60 ML/MIN/1.73 M^2
EST. GFR  (NON AFRICAN AMERICAN): >60 ML/MIN/1.73 M^2
GLUCOSE SERPL-MCNC: 101 MG/DL
GLUCOSE UR QL STRIP: NEGATIVE
HCT VFR BLD AUTO: 40.9 %
HGB BLD-MCNC: 13.7 G/DL
HGB UR QL STRIP: ABNORMAL
HYALINE CASTS UR QL AUTO: 1 /LPF
KETONES UR QL STRIP: ABNORMAL
LEUKOCYTE ESTERASE UR QL STRIP: ABNORMAL
LIPASE SERPL-CCNC: 101 U/L
LYMPHOCYTES # BLD AUTO: 1.8 K/UL
LYMPHOCYTES NFR BLD: 24.3 %
MCH RBC QN AUTO: 25.1 PG
MCHC RBC AUTO-ENTMCNC: 33.5 G/DL
MCV RBC AUTO: 75 FL
MICROSCOPIC COMMENT: ABNORMAL
MONOCYTES # BLD AUTO: 0.6 K/UL
MONOCYTES NFR BLD: 8.8 %
NEUTROPHILS # BLD AUTO: 4.8 K/UL
NEUTROPHILS NFR BLD: 65.7 %
NITRITE UR QL STRIP: NEGATIVE
PH UR STRIP: 8 [PH] (ref 5–8)
PLATELET # BLD AUTO: 321 K/UL
PMV BLD AUTO: 10.7 FL
POTASSIUM SERPL-SCNC: 2.8 MMOL/L
PROT SERPL-MCNC: 9 G/DL
PROT UR QL STRIP: ABNORMAL
RBC # BLD AUTO: 5.46 M/UL
RBC #/AREA URNS AUTO: 4 /HPF (ref 0–4)
SODIUM SERPL-SCNC: 141 MMOL/L
SP GR UR STRIP: 1.01 (ref 1–1.03)
SQUAMOUS #/AREA URNS AUTO: ABNORMAL /HPF
TSH SERPL DL<=0.005 MIU/L-ACNC: 2.11 UIU/ML
URN SPEC COLLECT METH UR: ABNORMAL
UROBILINOGEN UR STRIP-ACNC: >=8 EU/DL
WBC # BLD AUTO: 7.25 K/UL
WBC #/AREA URNS AUTO: 4 /HPF (ref 0–5)

## 2017-10-17 PROCEDURE — 25000003 PHARM REV CODE 250: Performed by: NURSE PRACTITIONER

## 2017-10-17 PROCEDURE — 11000001 HC ACUTE MED/SURG PRIVATE ROOM

## 2017-10-17 PROCEDURE — 84443 ASSAY THYROID STIM HORMONE: CPT

## 2017-10-17 PROCEDURE — 81000 URINALYSIS NONAUTO W/SCOPE: CPT

## 2017-10-17 PROCEDURE — 96361 HYDRATE IV INFUSION ADD-ON: CPT

## 2017-10-17 PROCEDURE — 63600175 PHARM REV CODE 636 W HCPCS: Performed by: NURSE PRACTITIONER

## 2017-10-17 PROCEDURE — 96365 THER/PROPH/DIAG IV INF INIT: CPT

## 2017-10-17 PROCEDURE — 83690 ASSAY OF LIPASE: CPT

## 2017-10-17 PROCEDURE — 80053 COMPREHEN METABOLIC PANEL: CPT

## 2017-10-17 PROCEDURE — 82150 ASSAY OF AMYLASE: CPT

## 2017-10-17 PROCEDURE — 99284 EMERGENCY DEPT VISIT MOD MDM: CPT | Mod: 25

## 2017-10-17 PROCEDURE — 85025 COMPLETE CBC W/AUTO DIFF WBC: CPT

## 2017-10-17 RX ORDER — SODIUM CHLORIDE AND POTASSIUM CHLORIDE 150; 900 MG/100ML; MG/100ML
1000 INJECTION, SOLUTION INTRAVENOUS
Status: COMPLETED | OUTPATIENT
Start: 2017-10-17 | End: 2017-10-17

## 2017-10-17 RX ORDER — SODIUM CHLORIDE AND POTASSIUM CHLORIDE 150; 900 MG/100ML; MG/100ML
1000 INJECTION, SOLUTION INTRAVENOUS
Status: DISCONTINUED | OUTPATIENT
Start: 2017-10-17 | End: 2017-10-17

## 2017-10-17 RX ORDER — POTASSIUM CHLORIDE 20 MEQ/15ML
20 SOLUTION ORAL
Status: COMPLETED | OUTPATIENT
Start: 2017-10-17 | End: 2017-10-17

## 2017-10-17 RX ORDER — POTASSIUM CHLORIDE 750 MG/1
10 TABLET, EXTENDED RELEASE ORAL
Status: DISCONTINUED | OUTPATIENT
Start: 2017-10-17 | End: 2017-10-17

## 2017-10-17 RX ORDER — ONDANSETRON 2 MG/ML
4 INJECTION INTRAMUSCULAR; INTRAVENOUS
Status: COMPLETED | OUTPATIENT
Start: 2017-10-17 | End: 2017-10-17

## 2017-10-17 RX ADMIN — PROMETHAZINE HYDROCHLORIDE 12.5 MG: 25 INJECTION INTRAMUSCULAR; INTRAVENOUS at 09:10

## 2017-10-17 RX ADMIN — ONDANSETRON 4 MG: 2 INJECTION, SOLUTION INTRAMUSCULAR; INTRAVENOUS at 08:10

## 2017-10-17 RX ADMIN — SODIUM CHLORIDE 1000 ML: 0.9 INJECTION, SOLUTION INTRAVENOUS at 06:10

## 2017-10-17 RX ADMIN — SODIUM CHLORIDE 1000 ML: 0.9 INJECTION, SOLUTION INTRAVENOUS at 08:10

## 2017-10-17 RX ADMIN — POTASSIUM CHLORIDE 20 MEQ: 20 SOLUTION ORAL at 08:10

## 2017-10-17 RX ADMIN — POTASSIUM CHLORIDE AND SODIUM CHLORIDE 1000 ML: 900; 150 INJECTION, SOLUTION INTRAVENOUS at 08:10

## 2017-10-17 NOTE — ED PROVIDER NOTES
Encounter Date: 10/17/2017       History     Chief Complaint   Patient presents with    Emesis     pt states has been vomiting x 4 days, states has been seen in ED x 3 and zofran and phenergan are not helping.  Pt reports + 4 episodes of vomiting today.       27-year-old female presents to emergency room with vomiting ×4 days.  States she's been seen at the emergency room 3 times for the same symptoms and they have not improved.  States she is unable to tolerate any oral fluids.  Reports generalized abdominal cramping that is worse in the lower abdomen and upper right quadrant.  Patient denies any history of GI illness.  Denies any ill contacts.  Denies any fever.  States she has been given several nausea medications which have not helped with symptoms.  Reports vomiting 6-7 times today.          Review of patient's allergies indicates:  No Known Allergies  Past Medical History:   Diagnosis Date    Anemia     Encounter for blood transfusion      Past Surgical History:   Procedure Laterality Date     SECTION      mass left breast removed  2006    non-cancerous per pt.     TUBAL LIGATION       Family History   Problem Relation Age of Onset    Diabetes Maternal Grandmother     Hypertension Maternal Grandmother     Diabetes Paternal Grandmother     Hypertension Paternal Grandmother      Social History   Substance Use Topics    Smoking status: Never Smoker    Smokeless tobacco: Never Used    Alcohol use Yes      Comment: Occasionally     Review of Systems   Constitutional: Negative for fever.   HENT: Negative for sore throat.    Respiratory: Negative for shortness of breath.    Cardiovascular: Negative for chest pain.   Gastrointestinal: Positive for abdominal pain and vomiting. Negative for nausea.   Genitourinary: Negative for dysuria.   Musculoskeletal: Negative for back pain.   Skin: Negative for rash.   Neurological: Negative for weakness.   Hematological: Does not bruise/bleed easily.   All  other systems reviewed and are negative.      Physical Exam     Initial Vitals [10/17/17 1758]   BP Pulse Resp Temp SpO2   (!) 143/74 70 16 97.7 °F (36.5 °C) 96 %      MAP       97         Physical Exam    Nursing note and vitals reviewed.  Constitutional: She appears well-developed and well-nourished. She is not diaphoretic.   HENT:   Head: Normocephalic.   Mouth/Throat: Mucous membranes are dry.   Eyes: Pupils are equal, round, and reactive to light.   Neck: Normal range of motion. Neck supple.   Cardiovascular: Normal rate.   Pulmonary/Chest: Breath sounds normal. No respiratory distress.   Abdominal: Soft. Bowel sounds are normal. She exhibits no distension, no abdominal bruit and no mass. There is generalized tenderness and tenderness in the right upper quadrant, right lower quadrant, periumbilical area and left lower quadrant. There is guarding. There is no rebound and no CVA tenderness. No hernia.   Neurological: She is alert and oriented to person, place, and time.   Skin: Skin is warm and dry. Capillary refill takes less than 2 seconds.   Psychiatric: She has a normal mood and affect. Her behavior is normal. Judgment and thought content normal.         ED Course   Procedures  Labs Reviewed   CBC W/ AUTO DIFFERENTIAL - Abnormal; Notable for the following:        Result Value    RBC 5.46 (*)     MCV 75 (*)     MCH 25.1 (*)     RDW 16.3 (*)     All other components within normal limits   COMPREHENSIVE METABOLIC PANEL - Abnormal; Notable for the following:     Potassium 2.8 (*)     CO2 20 (*)     Total Protein 9.0 (*)     Total Bilirubin 1.6 (*)     ALT 49 (*)     Anion Gap 18 (*)     All other components within normal limits   URINALYSIS - Abnormal; Notable for the following:     Protein, UA 1+ (*)     Ketones, UA 3+ (*)     Bilirubin (UA) 1+ (*)     Occult Blood UA 2+ (*)     Urobilinogen, UA >=8.0 (*)     Leukocytes, UA 1+ (*)     All other components within normal limits   AMYLASE - Abnormal; Notable for  the following:     Amylase 173 (*)     All other components within normal limits   URINALYSIS MICROSCOPIC - Abnormal; Notable for the following:     Bacteria, UA Few (*)     All other components within normal limits   LIPASE             Medical Decision Making:   Initial Assessment:   27-year-old female presents to emergency room with vomiting ×4 days.  States she's been seen at the emergency room 3 times for the same symptoms and they have not improved.  States she is unable to tolerate any oral fluids.  Reports generalized abdominal cramping that is worse in the lower abdomen and upper right quadrant.  Patient denies any history of GI illness.  Denies any ill contacts.  Denies any fever.  States she has been given several nausea medications which have not helped with symptoms.  Reports vomiting 6-7 times today.  Patient appears fatigued mucous membranes are somewhat dry.  Appears uncomfortable during assessment.  Previous labs revealed elevated lipase and amylase.  Ultrasound and CT scan were negative for findings.  Differential Diagnosis:   Colitis, gastritis, cholecystitis, hepatitis, pancreatitis, viral syndrome  Clinical Tests:   Lab Tests: Ordered and Reviewed  ED Management:  Based on the patient's presentation I believe she has failed outpatient treatment and may require admission for management of symptoms.  I review the patient's labs, ultrasound, CT scan from previous visits.  Her lipase and amylase a trending down however the patient continues to appear fatigued and weak.  Dr. Booker also saw the patient and agree that the patient appeared ill.  Today's labs throughout slightly elevated amylase a low potassium and dehydration. [facility] family medicine contacted.  Patient given IV fluids and potassium in the emergency department.  Patient had difficulty tolerating oral potassium.  We'll keep patient nothing by mouth at this time.  Other:   I discussed test(s) with the performing physician.       <> Summary  of the Findings: Spoke with Tita Granger [facility] family medicine.  Will admit patient for observation.                   ED Course      Clinical Impression:   The primary encounter diagnosis was Dehydration. Diagnoses of Hypokalemia, Weakness, and Nausea and vomiting, intractability of vomiting not specified, unspecified vomiting type were also pertinent to this visit.    Disposition:   Disposition: Placed in Observation  Condition: Stable  Accepted by Dr. Tita Menendez NP  10/17/17 0546

## 2017-10-17 NOTE — ED NOTES
NP at bedside.    Visit Information Date & Time Provider Department Dept. Phone Encounter #  
 2/24/2017  1:40 PM Jacey Farrell MD [facility] [phone] [id number] Follow-up Instructions Return in about 1 month (around 3/24/2017). Upcoming Health Maintenance Date Due DTaP/Tdap/Td series (1 - Tdap) 6/16/1968 FOBT Q 1 YEAR AGE 50-75 6/16/1997 ZOSTER VACCINE AGE 60> 6/16/2007 GLAUCOMA SCREENING Q2Y 6/16/2012 Pneumococcal 65+ Low/Medium Risk (1 of 2 - PCV13) 6/16/2012 MEDICARE YEARLY EXAM 6/16/2012 INFLUENZA AGE 9 TO ADULT 8/1/2016 Allergies as of 2/24/2017  Review Complete On: 1/23/2017 By: Mary Lanza LPN Severity Noted Reaction Type Reactions Lactose Medium 09/14/2016   Intolerance Itching Fructose  09/14/2016   Intolerance Other (comments) States added sugar to foods causes sore throat/ear ache. Inderal [Propranolol]  09/03/2016    Unknown (comments) Lisinopril  09/07/2016    Angioedema Gluten Low 09/14/2016   Intolerance Nausea Only Current Immunizations  Never Reviewed No immunizations on file. Not reviewed this visit You Were Diagnosed With   
  
 Codes Comments Myasthenia gravis in crisis [facility])    -  Primary ICD-10-CM: G70.01 
ICD-9-CM: 358.01 Vitals BP  
  
  
  
  
  
 114/70 BMI and BSA Data Body Mass Index Body Surface Area  
 44.13 kg/m 2 2.68 m 2 Preferred Pharmacy Pharmacy Name Phone [facility] [place], [address] [place] AT [facility] [phone] Your Updated Medication List  
  
   
This list is accurate as of: 2/24/17  2:29 PM.  Always use your most recent med list.  
  
  
  
  
 albuterol-ipratropium 2.5 mg-0.5 mg/3 ml Nebu Commonly known as:  DUO-NEB  
3 mL by Nebulization route every six (6) hours as needed. amoxicillin 500 mg capsule Commonly known as:  AMOXIL Take 500 mg by mouth two (2) times a day. aspirin delayed-release 81 mg tablet Commonly known as:  ASPIR-LOW Take 1 Tab by mouth daily. CLARITIN 10 mg tablet Generic drug:  loratadine Take 10 mg by mouth.  
  
 cloNIDine 0.2 mg/24 hr patch Commonly known as:  CATAPRES  
1 Patch by TransDERmal route every seven (7) days. doxepin 25 mg capsule Commonly known as:  SINEquan Take 1 Cap by mouth nightly. mycophenolate 500 mg tablet Commonly known as:  CELLCEPT Take 2 Tabs by mouth two (2) times a day. potassium chloride SA 10 mEq capsule Commonly known as:  Alberta Harden Take 10 mEq by mouth two (2) times a day. predniSONE 10 mg tablet Commonly known as:  Manoj Gun Take 5 Tabs by mouth daily (with breakfast). PROTONIX 20 mg tablet Generic drug:  pantoprazole Take 20 mg by mouth daily. pyridostigmine 60 mg tablet Commonly known as:  MESTINON  
TAKE 1 TABLET BY MOUTH THREE TIMES DAILY topiramate 25 mg tablet Commonly known as:  TOPAMAX Take 1 Tab by mouth nightly. XANAX 0.25 mg tablet Generic drug:  ALPRAZolam  
Take  by mouth. Prescriptions Printed Refills  
 mycophenolate (CELLCEPT) 500 mg tablet 5 Sig: Take 2 Tabs by mouth two (2) times a day. Class: Print Route: Oral  
  
Follow-up Instructions Return in about 1 month (around 3/24/2017). Introducing [facility]! Dear John Lazo: Thank you for requesting a [employer] account. Our records indicate that you already have an active [employer] account. You can access your account anytime at [email or web address] Did you know that you can access your hospital and ER discharge instructions at any time in [employer]? You can also review all of your test results from your hospital stay or ER visit. Additional Information If you have questions, please visit the Frequently Asked Questions section of the [employer] website at [email or web address] Remember, [employer] is NOT to be used for urgent needs. For medical emergencies, dial 911. Now available from your iPhone and Android! Please provide this summary of care documentation to your next provider. Your primary care clinician is listed as [facility]. If you have any questions after today's visit, please call [phone].

## 2017-10-18 PROBLEM — R11.2 NAUSEA AND VOMITING: Status: ACTIVE | Noted: 2017-10-18

## 2017-10-18 LAB
ALBUMIN SERPL BCP-MCNC: 3.1 G/DL
ALP SERPL-CCNC: 50 U/L
ALT SERPL W/O P-5'-P-CCNC: 22 U/L
ANION GAP SERPL CALC-SCNC: 6 MMOL/L
AST SERPL-CCNC: 17 U/L
BASOPHILS # BLD AUTO: 0.02 K/UL
BASOPHILS NFR BLD: 0.4 %
BILIRUB SERPL-MCNC: 1.2 MG/DL
BUN SERPL-MCNC: 7 MG/DL
CALCIUM SERPL-MCNC: 8.6 MG/DL
CHLORIDE SERPL-SCNC: 110 MMOL/L
CHOLEST SERPL-MCNC: 114 MG/DL
CHOLEST/HDLC SERPL: 3.7 {RATIO}
CO2 SERPL-SCNC: 20 MMOL/L
CREAT SERPL-MCNC: 0.7 MG/DL
DIFFERENTIAL METHOD: ABNORMAL
EOSINOPHIL # BLD AUTO: 0 K/UL
EOSINOPHIL NFR BLD: 0.4 %
ERYTHROCYTE [DISTWIDTH] IN BLOOD BY AUTOMATED COUNT: 16.1 %
EST. GFR  (AFRICAN AMERICAN): >60 ML/MIN/1.73 M^2
EST. GFR  (NON AFRICAN AMERICAN): >60 ML/MIN/1.73 M^2
GLUCOSE SERPL-MCNC: 86 MG/DL
HCT VFR BLD AUTO: 33.5 %
HDLC SERPL-MCNC: 31 MG/DL
HDLC SERPL: 27.2 %
HGB BLD-MCNC: 10.7 G/DL
LDLC SERPL CALC-MCNC: 74.4 MG/DL
LYMPHOCYTES # BLD AUTO: 2.2 K/UL
LYMPHOCYTES NFR BLD: 39.1 %
MAGNESIUM SERPL-MCNC: 1.7 MG/DL
MCH RBC QN AUTO: 24.7 PG
MCHC RBC AUTO-ENTMCNC: 31.9 G/DL
MCV RBC AUTO: 77 FL
MONOCYTES # BLD AUTO: 0.7 K/UL
MONOCYTES NFR BLD: 12.5 %
NEUTROPHILS # BLD AUTO: 2.7 K/UL
NEUTROPHILS NFR BLD: 47.4 %
NONHDLC SERPL-MCNC: 83 MG/DL
PHOSPHATE SERPL-MCNC: 3.3 MG/DL
PLATELET # BLD AUTO: 239 K/UL
PMV BLD AUTO: 9.8 FL
POTASSIUM SERPL-SCNC: 3.5 MMOL/L
PROT SERPL-MCNC: 6.2 G/DL
RBC # BLD AUTO: 4.33 M/UL
SODIUM SERPL-SCNC: 136 MMOL/L
TRIGL SERPL-MCNC: 43 MG/DL
WBC # BLD AUTO: 5.62 K/UL

## 2017-10-18 PROCEDURE — 85025 COMPLETE CBC W/AUTO DIFF WBC: CPT

## 2017-10-18 PROCEDURE — 36415 COLL VENOUS BLD VENIPUNCTURE: CPT

## 2017-10-18 PROCEDURE — 84100 ASSAY OF PHOSPHORUS: CPT

## 2017-10-18 PROCEDURE — 94761 N-INVAS EAR/PLS OXIMETRY MLT: CPT

## 2017-10-18 PROCEDURE — 63600175 PHARM REV CODE 636 W HCPCS: Performed by: FAMILY MEDICINE

## 2017-10-18 PROCEDURE — 83735 ASSAY OF MAGNESIUM: CPT

## 2017-10-18 PROCEDURE — 80053 COMPREHEN METABOLIC PANEL: CPT

## 2017-10-18 PROCEDURE — 25000003 PHARM REV CODE 250: Performed by: FAMILY MEDICINE

## 2017-10-18 PROCEDURE — 11000001 HC ACUTE MED/SURG PRIVATE ROOM

## 2017-10-18 PROCEDURE — 80061 LIPID PANEL: CPT

## 2017-10-18 PROCEDURE — S0028 INJECTION, FAMOTIDINE, 20 MG: HCPCS | Performed by: FAMILY MEDICINE

## 2017-10-18 RX ORDER — MORPHINE SULFATE 2 MG/ML
1 INJECTION, SOLUTION INTRAMUSCULAR; INTRAVENOUS EVERY 4 HOURS PRN
Status: DISCONTINUED | OUTPATIENT
Start: 2017-10-18 | End: 2017-10-19 | Stop reason: HOSPADM

## 2017-10-18 RX ORDER — SODIUM CHLORIDE AND POTASSIUM CHLORIDE 150; 900 MG/100ML; MG/100ML
1000 INJECTION, SOLUTION INTRAVENOUS CONTINUOUS
Status: DISCONTINUED | OUTPATIENT
Start: 2017-10-18 | End: 2017-10-18

## 2017-10-18 RX ORDER — HYDRALAZINE HYDROCHLORIDE 20 MG/ML
10 INJECTION INTRAMUSCULAR; INTRAVENOUS EVERY 6 HOURS PRN
Status: DISCONTINUED | OUTPATIENT
Start: 2017-10-18 | End: 2017-10-19 | Stop reason: HOSPADM

## 2017-10-18 RX ORDER — FAMOTIDINE 10 MG/ML
20 INJECTION INTRAVENOUS 2 TIMES DAILY
Status: DISCONTINUED | OUTPATIENT
Start: 2017-10-18 | End: 2017-10-19 | Stop reason: HOSPADM

## 2017-10-18 RX ORDER — IBUPROFEN 600 MG/1
600 TABLET ORAL ONCE
Status: COMPLETED | OUTPATIENT
Start: 2017-10-18 | End: 2017-10-18

## 2017-10-18 RX ORDER — ONDANSETRON 2 MG/ML
4 INJECTION INTRAMUSCULAR; INTRAVENOUS EVERY 6 HOURS PRN
Status: DISCONTINUED | OUTPATIENT
Start: 2017-10-18 | End: 2017-10-19 | Stop reason: HOSPADM

## 2017-10-18 RX ORDER — SODIUM CHLORIDE AND POTASSIUM CHLORIDE 150; 900 MG/100ML; MG/100ML
1000 INJECTION, SOLUTION INTRAVENOUS CONTINUOUS
Status: DISPENSED | OUTPATIENT
Start: 2017-10-18 | End: 2017-10-18

## 2017-10-18 RX ADMIN — SODIUM CHLORIDE AND POTASSIUM CHLORIDE 1000 ML: 9; 1.49 INJECTION, SOLUTION INTRAVENOUS at 03:10

## 2017-10-18 RX ADMIN — PROMETHAZINE HYDROCHLORIDE 12.5 MG: 25 INJECTION INTRAMUSCULAR; INTRAVENOUS at 11:10

## 2017-10-18 RX ADMIN — SODIUM CHLORIDE AND POTASSIUM CHLORIDE 1000 ML: 9; 1.49 INJECTION, SOLUTION INTRAVENOUS at 01:10

## 2017-10-18 RX ADMIN — FAMOTIDINE 20 MG: 10 INJECTION, SOLUTION INTRAVENOUS at 09:10

## 2017-10-18 RX ADMIN — IBUPROFEN 600 MG: 600 TABLET ORAL at 08:10

## 2017-10-18 RX ADMIN — IBUPROFEN 600 MG: 600 TABLET, FILM COATED ORAL at 08:10

## 2017-10-18 RX ADMIN — ONDANSETRON 4 MG: 2 INJECTION INTRAMUSCULAR; INTRAVENOUS at 07:10

## 2017-10-18 RX ADMIN — SODIUM CHLORIDE AND POTASSIUM CHLORIDE 1000 ML: 9; 1.49 INJECTION, SOLUTION INTRAVENOUS at 10:10

## 2017-10-18 RX ADMIN — FAMOTIDINE 20 MG: 10 INJECTION, SOLUTION INTRAVENOUS at 08:10

## 2017-10-18 NOTE — ED NOTES
[facility] dispatcher called to inform transport will be delayed. Will be here in the next 35-45 minutes.

## 2017-10-18 NOTE — ED NOTES
Patient complaining of dry mouth. Requested small cup of ice chips. SHAQUILLE Curtis aware and verbalized patient may have small amount of ice chips at this time. Will continue to monitor patient.

## 2017-10-18 NOTE — H&P
History & Physical  [facility]      SUBJECTIVE:     History of Present Illness:  Patient is a 27 y.o. female with PMHx of Iron Deficiency Anemia admitted from [facility] with complaints of constant nausea and vomiting x 4 days. Patient has been seen in ED multiple times in past week due to these symptoms. Reports that her children had N/V x1 day at home around the time her N/V began. States N/V is not associated with eating and patient states she has not eaten for 2 days. Emesis is described as yellow. Associated symptoms include epigastric pain, one episode of loose stools and lower back pain exacerbated by vomiting. Denies SOB, cough, nasal congestion.     Patient was given Phenergan and Zofran prior to arrive in [place] which provided some relief.      PTA Medications   Medication Sig    ferrous sulfate 325 mg (65 mg iron) Tab tablet Take 1 tablet (325 mg total) by mouth daily with breakfast.    hydrocodone-acetaminophen 5-325mg (NORCO) 5-325 mg per tablet Take 1 tablet by mouth every 8 (eight) hours as needed for Pain.    loratadine (CLARITIN) 10 mg tablet Take 10 mg by mouth once daily.    prochlorperazine (COMPAZINE) 10 MG tablet Take 1 tablet (10 mg total) by mouth every 6 (six) hours as needed.    promethazine (PHENERGAN) 25 MG suppository Place 1 suppository (25 mg total) rectally every 6 (six) hours as needed for Nausea.    senna-docusate 8.6-50 mg (PERICOLACE) 8.6-50 mg per tablet Take 1 tablet by mouth 2 (two) times daily.       Review of patient's allergies indicates:  No Known Allergies    Past Medical History:   Diagnosis Date    Anemia     Encounter for blood transfusion      Past Surgical History:   Procedure Laterality Date     SECTION      mass left breast removed  2006    non-cancerous per pt.     TUBAL LIGATION       Family History   Problem Relation Age of Onset    Diabetes Maternal Grandmother     Hypertension Maternal Grandmother     Diabetes Paternal  Grandmother     Hypertension Paternal Grandmother      Social History   Substance Use Topics    Smoking status: Never Smoker    Smokeless tobacco: Never Used    Alcohol use Yes      Comment: Occasionally. Pt smokes marijuana daily        Review of Systems:  Review of Systems   Constitutional: Positive for malaise/fatigue. Negative for chills and fever.   HENT: Negative for congestion.    Respiratory: Negative for cough and shortness of breath.    Cardiovascular: Negative for chest pain.   Gastrointestinal: Positive for abdominal pain, nausea and vomiting.   Genitourinary: Negative for dysuria and urgency.   Musculoskeletal: Negative for myalgias.   Neurological: Negative for dizziness and headaches.       OBJECTIVE:     Vital Signs (Most Recent)  Temp: 98.8 °F (37.1 °C) (10/18/17 0113)  Pulse: 61 (10/18/17 0113)  Resp: 18 (10/18/17 0113)  BP: (!) 143/93 (10/18/17 0113)  SpO2: 100 % (10/18/17 0113)    Physical Exam:  Physical Exam   Constitutional: She is well-developed, well-nourished, and in no distress. No distress.   HENT:   Head: Normocephalic and atraumatic.   Mildly dry oral mucous membranes   Cardiovascular: Normal rate, regular rhythm and normal heart sounds.    Pulmonary/Chest: Effort normal and breath sounds normal.   Abdominal: Soft. There is tenderness (epigastric). There is no rebound and no guarding.   Decreased bowel sounds   Musculoskeletal: Normal range of motion. She exhibits no edema, tenderness or deformity.   Skin: Skin is warm and dry.   Psychiatric: Mood, memory, affect and judgment normal.       LABS  CBC    Recent Labs  Lab 10/13/17  1127 10/14/17  1038 10/17/17  1853   WBC 11.02 9.66 7.25   RBC 5.09 4.78 5.46*   HGB 12.6 11.9* 13.7   HCT 39.8 37.1 40.9    258 321   MCV 78* 78* 75*   MCH 24.8* 24.9* 25.1*   MCHC 31.7* 32.1 33.5     BMP    Recent Labs  Lab 10/13/17  1127 10/14/17  1038 10/17/17  1853    142 141   K 3.6 3.0* 2.8*   CO2 20* 21* 20*    106 103   BUN 14 14  10   CREATININE 0.80 0.75 0.69       Recent Labs  Lab 10/13/17  1127 10/14/17  1038 10/17/17  1853   CALCIUM 9.9 10.0 10.3     LFT    Recent Labs  Lab 10/13/17  1127 10/14/17  1038 10/17/17  1853   PROT 8.2 8.2 9.0*   ALBUMIN 4.8 4.8 5.2   BILITOT 1.4* 1.6* 1.6*   AST 33 40 31   ALKPHOS 81 73 83   ALT 37 41 49*         Recent Labs  Lab 10/17/17  1830   COLORU Mayte   SPECGRAV 1.015   PHUR 8.0   PROTEINUA 1+*   BACTERIA Few*       Diagnostic Results:  CT Abdomen: No acute abnormality identified in the abdomen or pelvis.Gallbladder and bile ducts unremarkable. Pancreas, spleen and adrenals unremarkable.    US Abdomen: The liver is unremarkable. The gallbladder is empty. The common bile duct is normal at 0.23  cm. The pancreas is normal. The right kidney is normal at 10.5 cm.  The aorta, vena cava,and portal vein as visualized appear free of significant abnormality.    ASSESSMENT/PLAN:   27 y.o.female with PMHx of Iron Def Anemia admitted for 4 day history of N/V likely secondary to pancreatitis versus pancreatitis.    Plan: Admit to [facility]    Nausea and Vomiting  -Pt with 4 day history of N/V, unable to tolerate PO  -3 recent visits to ED with complaints of N/V and epigastric pain.   -Children at home with 1 day history of similar symptoms  -ED Lipase: 10/13: 67. 10/14: 876, 10/17: 101  -CT abdomen and US abdomen scan with no gallstones or signs of pancreatitis  -Symptoms likely secondary to pancreatitis versus gastroenteritis  -Lipid panel pending. Denies alcohol use. Reports daily Marijuana use  -Phenergan, Zofran, IVFs, Morphine PRN, NPO    Hyperkalemia  -Pt with K+ 2.8 at RVP.  -Likely 2/2 vomiting  -Repleted with IV K+ and IVF containing K+  -F/U CMP    Iron Deficiency Anemia  -On home Iron tablets  -H/H stable 13.7/40.9, MCV 75  -Continue iron tabs at discharge    PPX: DVT: CURT           GI: Famotidine           Diet: NPO    Dispo: Anti Emetics, Monitor N/V, F/U K+ and labs    Tita M Granger,  MD  10/18/2017  [facility] HO-2      10/18/2017 Tita Granger M.D.

## 2017-10-18 NOTE — PLAN OF CARE
Problem: Patient Care Overview  Goal: Plan of Care Review  Outcome: Ongoing (interventions implemented as appropriate)  Patient had one bout of nausea/ vomiting.  Medicated with antiemetics per MD order.  IV fluids running without difficulty.  Patient advanced to Clear Liquids for dinner.  Will continue to monitor.

## 2017-10-18 NOTE — ED NOTES
House Supervisor at [facility] called to inform us that Charge Nurse will assign the bed shortly.

## 2017-10-18 NOTE — ED NOTES
Patient tolerated Oral Potassium. No vomiting/nausea noted at this time. Will continue to monitor patient.

## 2017-10-18 NOTE — ED PROVIDER NOTES
Encounter Date: 10/17/2017       History     Chief Complaint   Patient presents with    Emesis     pt states has been vomiting x 4 days, states has been seen in ED x 3 and zofran and phenergan are not helping.  Pt reports + 4 episodes of vomiting today.       HPI  Review of patient's allergies indicates:  No Known Allergies  Past Medical History:   Diagnosis Date    Anemia     Encounter for blood transfusion      Past Surgical History:   Procedure Laterality Date     SECTION      mass left breast removed  2006    non-cancerous per pt.     TUBAL LIGATION       Family History   Problem Relation Age of Onset    Diabetes Maternal Grandmother     Hypertension Maternal Grandmother     Diabetes Paternal Grandmother     Hypertension Paternal Grandmother      Social History   Substance Use Topics    Smoking status: Never Smoker    Smokeless tobacco: Never Used    Alcohol use Yes      Comment: Occasionally     Review of Systems    Physical Exam     Initial Vitals [10/17/17 1758]   BP Pulse Resp Temp SpO2   (!) 143/74 70 16 97.7 °F (36.5 °C) 96 %      MAP       97         Physical Exam    ED Course   Procedures  Labs Reviewed   CBC W/ AUTO DIFFERENTIAL - Abnormal; Notable for the following:        Result Value    RBC 5.46 (*)     MCV 75 (*)     MCH 25.1 (*)     RDW 16.3 (*)     All other components within normal limits   COMPREHENSIVE METABOLIC PANEL - Abnormal; Notable for the following:     Potassium 2.8 (*)     CO2 20 (*)     Total Protein 9.0 (*)     Total Bilirubin 1.6 (*)     ALT 49 (*)     Anion Gap 18 (*)     All other components within normal limits   URINALYSIS - Abnormal; Notable for the following:     Protein, UA 1+ (*)     Ketones, UA 3+ (*)     Bilirubin (UA) 1+ (*)     Occult Blood UA 2+ (*)     Urobilinogen, UA >=8.0 (*)     Leukocytes, UA 1+ (*)     All other components within normal limits   AMYLASE - Abnormal; Notable for the following:     Amylase 173 (*)     All other components  within normal limits   URINALYSIS MICROSCOPIC - Abnormal; Notable for the following:     Bacteria, UA Few (*)     All other components within normal limits   LIPASE   TSH             Medical Decision Making:   ED Management:  The patient been accepted by Tita Granger hospitalist at [facility].  She'll be transferred over there for IV fluid rehydration as well as correction of her  Abnormalities.                   ED Course      Clinical Impression:   The primary encounter diagnosis was Dehydration. Diagnoses of Hypokalemia, Weakness, and Nausea and vomiting, intractability of vomiting not specified, unspecified vomiting type were also pertinent to this visit.    Disposition:   Disposition: Admitted  Condition: Stable                        Masood Booker MD  10/17/17 8229

## 2017-10-18 NOTE — ED NOTES
Patient tolerating KCL infusion at this time. No complaints at this time. Will continue to monitor patient.

## 2017-10-18 NOTE — PLAN OF CARE
Problem: Patient Care Overview  Goal: Plan of Care Review  Outcome: Ongoing (interventions implemented as appropriate)  Pt in NAD. V/s stable AAOx4. PIV infusing NS with 20 mEq of K at 100 mL/hr per MD order dressing CDI. Scheduled medications given per MAR. Pt reports intermittent nausea. Continuous cardiac monitoring in place NSR with HR in the 60s box: 4992. Encouraged to call for assistance verbalized understanding. Safety maintained bed in low locked position, SR up X2, and call bell in reach. Will continue to monitor.

## 2017-10-18 NOTE — ED NOTES
Received report from Roxi Childress RN. Patient roomed in [place] [facility]. Patient in no apparent pain or distress at this time.

## 2017-10-18 NOTE — PLAN OF CARE
Problem: Patient Care Overview  Goal: Plan of Care Review  Room air SpO2   100%. Pt with no apparent distess noted. Will continue to monitor.

## 2017-10-18 NOTE — ED NOTES
Waiting for [facility] to call back at this time. Per SHAQUILLE Curtis.

## 2017-10-18 NOTE — PLAN OF CARE
Pt reports she lives with her children and their father who can provide help at home if needed and will provide transportation home when discharged. Pt reports she is independent and has no discharge needs.   Tn to follow.       10/18/17 1056   Discharge Assessment   Assessment Type Discharge Planning Assessment   Confirmed/corrected address and phone number on facesheet? Yes   Assessment information obtained from? Patient   Expected Length of Stay (days) 1   Communicated expected length of stay with patient/caregiver yes   Prior to hospitilization cognitive status: Alert/Oriented   Prior to hospitalization functional status: Independent   Current cognitive status: Alert/Oriented   Current Functional Status: Independent   Lives With child(tamara), dependent;significant other   Able to Return to Prior Arrangements yes   Is patient able to care for self after discharge? Yes   Who are your caregiver(s) and their phone number(s)? Gabriela Lamb (mother) [phone]   Patient's perception of discharge disposition home or selfcare   Readmission Within The Last 30 Days no previous admission in last 30 days   Patient currently being followed by outpatient case management? No   Patient currently receives any other outside agency services? No   Equipment Currently Used at Home none   Do you have any problems affording any of your prescribed medications? No   Is the patient taking medications as prescribed? yes   Does the patient have transportation home? Yes  (Sood (s.o.))   Transportation Available car;family or friend will provide   Does the patient receive services at the [facility]? No   Discharge Plan A Home with family   Discharge Plan B Home with family   Patient/Family In Agreement With Plan yes

## 2017-10-19 VITALS
BODY MASS INDEX: 18.94 KG/M2 | DIASTOLIC BLOOD PRESSURE: 78 MMHG | WEIGHT: 125 LBS | SYSTOLIC BLOOD PRESSURE: 120 MMHG | HEIGHT: 68 IN | OXYGEN SATURATION: 100 % | RESPIRATION RATE: 16 BRPM | TEMPERATURE: 98 F | HEART RATE: 69 BPM

## 2017-10-19 PROBLEM — R11.2 NAUSEA AND VOMITING: Status: RESOLVED | Noted: 2017-10-18 | Resolved: 2017-10-19

## 2017-10-19 PROBLEM — E86.0 DEHYDRATION: Status: RESOLVED | Noted: 2017-10-17 | Resolved: 2017-10-19

## 2017-10-19 LAB
ALBUMIN SERPL BCP-MCNC: 3.4 G/DL
ALP SERPL-CCNC: 55 U/L
ALT SERPL W/O P-5'-P-CCNC: 18 U/L
ANION GAP SERPL CALC-SCNC: 6 MMOL/L
AST SERPL-CCNC: 15 U/L
BASOPHILS # BLD AUTO: 0.02 K/UL
BASOPHILS NFR BLD: 0.5 %
BILIRUB SERPL-MCNC: 0.9 MG/DL
BUN SERPL-MCNC: 5 MG/DL
CALCIUM SERPL-MCNC: 9 MG/DL
CHLORIDE SERPL-SCNC: 108 MMOL/L
CO2 SERPL-SCNC: 22 MMOL/L
CREAT SERPL-MCNC: 0.8 MG/DL
DIFFERENTIAL METHOD: ABNORMAL
EOSINOPHIL # BLD AUTO: 0.1 K/UL
EOSINOPHIL NFR BLD: 1.6 %
ERYTHROCYTE [DISTWIDTH] IN BLOOD BY AUTOMATED COUNT: 16.1 %
EST. GFR  (AFRICAN AMERICAN): >60 ML/MIN/1.73 M^2
EST. GFR  (NON AFRICAN AMERICAN): >60 ML/MIN/1.73 M^2
GLUCOSE SERPL-MCNC: 83 MG/DL
HCT VFR BLD AUTO: 34.5 %
HGB BLD-MCNC: 11.2 G/DL
LYMPHOCYTES # BLD AUTO: 2.3 K/UL
LYMPHOCYTES NFR BLD: 52 %
MAGNESIUM SERPL-MCNC: 1.9 MG/DL
MCH RBC QN AUTO: 25 PG
MCHC RBC AUTO-ENTMCNC: 32.5 G/DL
MCV RBC AUTO: 77 FL
MONOCYTES # BLD AUTO: 0.5 K/UL
MONOCYTES NFR BLD: 11.5 %
NEUTROPHILS # BLD AUTO: 1.5 K/UL
NEUTROPHILS NFR BLD: 34.2 %
PHOSPHATE SERPL-MCNC: 2.6 MG/DL
PLATELET # BLD AUTO: 254 K/UL
PMV BLD AUTO: 9.8 FL
POTASSIUM SERPL-SCNC: 3.7 MMOL/L
PROT SERPL-MCNC: 6.5 G/DL
RBC # BLD AUTO: 4.48 M/UL
SODIUM SERPL-SCNC: 136 MMOL/L
WBC # BLD AUTO: 4.33 K/UL

## 2017-10-19 PROCEDURE — 90686 IIV4 VACC NO PRSV 0.5 ML IM: CPT | Performed by: FAMILY MEDICINE

## 2017-10-19 PROCEDURE — 83735 ASSAY OF MAGNESIUM: CPT

## 2017-10-19 PROCEDURE — 3E0234Z INTRODUCTION OF SERUM, TOXOID AND VACCINE INTO MUSCLE, PERCUTANEOUS APPROACH: ICD-10-PCS | Performed by: FAMILY MEDICINE

## 2017-10-19 PROCEDURE — 80053 COMPREHEN METABOLIC PANEL: CPT

## 2017-10-19 PROCEDURE — 36415 COLL VENOUS BLD VENIPUNCTURE: CPT

## 2017-10-19 PROCEDURE — 85025 COMPLETE CBC W/AUTO DIFF WBC: CPT

## 2017-10-19 PROCEDURE — 90472 IMMUNIZATION ADMIN EACH ADD: CPT | Performed by: FAMILY MEDICINE

## 2017-10-19 PROCEDURE — S0028 INJECTION, FAMOTIDINE, 20 MG: HCPCS | Performed by: FAMILY MEDICINE

## 2017-10-19 PROCEDURE — 63600175 PHARM REV CODE 636 W HCPCS: Performed by: FAMILY MEDICINE

## 2017-10-19 PROCEDURE — 90471 IMMUNIZATION ADMIN: CPT | Performed by: FAMILY MEDICINE

## 2017-10-19 PROCEDURE — 84100 ASSAY OF PHOSPHORUS: CPT

## 2017-10-19 PROCEDURE — 25000003 PHARM REV CODE 250: Performed by: FAMILY MEDICINE

## 2017-10-19 PROCEDURE — 90670 PCV13 VACCINE IM: CPT | Performed by: FAMILY MEDICINE

## 2017-10-19 RX ADMIN — PNEUMOCOCCAL 13-VALENT CONJUGATE VACCINE 0.5 ML: 2.2; 2.2; 2.2; 2.2; 2.2; 4.4; 2.2; 2.2; 2.2; 2.2; 2.2; 2.2; 2.2 INJECTION, SUSPENSION INTRAMUSCULAR at 10:10

## 2017-10-19 RX ADMIN — INFLUENZA A VIRUS A/MICHIGAN/45/2015 X-275 (H1N1) ANTIGEN (FORMALDEHYDE INACTIVATED), INFLUENZA A VIRUS A/HONG KONG/4801/2014 X-263B (H3N2) ANTIGEN (FORMALDEHYDE INACTIVATED), INFLUENZA B VIRUS B/PHUKET/3073/2013 ANTIGEN (FORMALDEHYDE INACTIVATED), AND INFLUENZA B VIRUS B/BRISBANE/60/2008 ANTIGEN (FORMALDEHYDE INACTIVATED) 0.5 ML: 15; 15; 15; 15 INJECTION, SUSPENSION INTRAMUSCULAR at 12:10

## 2017-10-19 RX ADMIN — FAMOTIDINE 20 MG: 10 INJECTION, SOLUTION INTRAVENOUS at 08:10

## 2017-10-19 NOTE — PROGRESS NOTES
.Pharmacy New Medication Education    Patient accepted medication education.    Pharmacy educated patient on name and purpose of medications and possible side effects, using the teach-back method.     Pepcid  Hydrlaazine  Morphine  Zofran  phenergan    Learners of pharmacy medication education included:  patient    Patient +/- learner response:  teachback

## 2017-10-19 NOTE — PLAN OF CARE
TN met with pt prior to d/c   per pt - ok for TN to make f/u apt and fax d/c summ to [facility] at    no questions re:  d/c meds, post d/c instructions   gave her TN's name/#      f/u with Ms. Raven NP  at [facility] 10/26/17 at 9:30 am              10/19/17 6656   Final Note   Assessment Type Final Discharge Note   Discharge Disposition Home   What phone number can be called within the next 1-3 days to see how you are doing after discharge? [phone]   Hospital Follow Up  Appt(s) scheduled? Yes   Discharge plans and expectations educations in teach back method with documentation complete? Yes   Right Care Referral Info   Post Acute Recommendation No Care   Referral Type (no care )

## 2017-10-19 NOTE — PLAN OF CARE
Problem: Patient Care Overview  Goal: Plan of Care Review  Outcome: Ongoing (interventions implemented as appropriate)     AA&Ox4. No complaints of n/v during shift; tolerating clear liquids. Bed in low position, side rails up x2, call light within reach, patient safety maintained. Educated patient to call for any needs or assistance. Patient verbalized understanding.  Patient showered before bedtime. Pain management discussed and maintained.  Tele monitor #[id number] on, NSR with HR ranging 60-80's.  Will continue to monitor.

## 2017-10-19 NOTE — PROGRESS NOTES
Follow-Up       Follow-up With  Details  Why  Contact Info   [facility]  On 10/26/2017  9:30 am You will see Alda Carbajal NP fax # [phone] [address] [facility] [id number] [phone]

## 2017-10-19 NOTE — PROGRESS NOTES
Patient complains of mild headache (4-5/10) and requests tylenol or ibuprofen.  Notified Dr. Granger; MD verbalized understanding.  MD to put in order. NAD noted. Will continue to monitor.

## 2017-10-19 NOTE — DISCHARGE SUMMARY
[facility]  Discharge Summary      Patient Name: Diana Diop  MRN: [id number]  Admission Date: 10/17/2017  Hospital Length of Stay: 1 days  Discharge Date and Time:  10/19/2017 10:41 AM  Attending Physician: Mata Ramirez III, MD   Discharging Provider: [place] MD  Primary Care Provider: [facility]     HPI: Patient is a 27 y.o. female with PMHx of Iron Deficiency Anemia admitted from [facility] with complaints of constant nausea and vomiting x 4 days. Patient has been seen in ED multiple times in past week due to these symptoms. Reports that her children had N/V x1 day at home around the time her N/V began. States N/V is not associated with eating and patient states she has not eaten for 2 days. Emesis is described as yellow. Associated symptoms include epigastric pain, one episode of loose stools and lower back pain exacerbated by vomiting. Denies SOB, cough, nasal congestion.     Patient was given Phenergan and Zofran prior to arrive in [place] which provided some relief.    * No surgery found *     Hospital Course: Patient admitted for IV fluids and antiemetics.  Patient now able to tolerate PO.  Patient improved clinically and is deemed suitable for discharge.      Physical Exam:   GEN:  NAD  HEENT:  CATHY PERRL OP Clear  CV:  RRR no m/r/g  RESP:  CTAB no w/r/r  ABD:  Soft NT/ND +BS  NEURO:  No neurological deficits.        Consults: None    Significant Diagnostic Studies: Labs:   CMP   Recent Labs  Lab 10/17/17  1853 10/18/17  0529 10/19/17  0442    136 136   K 2.8* 3.5 3.7    110 108   CO2 20* 20* 22*    86 83   BUN 10 7 5*   CREATININE 0.69 0.7 0.8   CALCIUM 10.3 8.6* 9.0   PROT 9.0* 6.2 6.5   ALBUMIN 5.2 3.1* 3.4*   BILITOT 1.6* 1.2* 0.9   ALKPHOS 83 50* 55   AST 31 17 15   ALT 49* 22 18   ANIONGAP 18* 6* 6*   ESTGFRAFRICA >60.0 >60 >60   EGFRNONAA >60.0 >60 >60    and CBC   Recent Labs  Lab 10/17/17  1853 10/18/17  0522  10/19/17  0442   WBC 7.25 5.62 4.33   HGB 13.7 10.7* 11.2*   HCT 40.9 33.5* 34.5*    239 254       Pending Diagnostic Studies:     None        Final Active Diagnoses:    Diagnosis Date Noted POA      Problems Resolved During this Admission:    Diagnosis Date Noted Date Resolved POA    PRINCIPAL PROBLEM:  Nausea and vomiting [R11.2] 10/18/2017 10/19/2017 Unknown    Dehydration [E86.0] 10/17/2017 10/19/2017 Yes      Discharged Condition: stable    Disposition: Home or Self Care    Follow Up:  Follow-up Information     [facility].    Why:  As needed  Contact information:  [facility] [facility] [address] [phone]                 Patient Instructions:     Diet general     Activity as tolerated     Call MD for:  temperature >100.4     Call MD for:  persistent nausea and vomiting or diarrhea     Call MD for:  severe uncontrolled pain     Call MD for:  redness, tenderness, or signs of infection (pain, swelling, redness, odor or green/yellow discharge around incision site)     Call MD for:  difficulty breathing or increased cough     Call MD for:  severe persistent headache     Call MD for:  worsening rash     Call MD for:  persistent dizziness, light-headedness, or visual disturbances     Call MD for:  increased confusion or weakness     Call MD for:       Medications:  Reconciled Home Medications:   Current Discharge Medication List      CONTINUE these medications which have NOT CHANGED    Details   ferrous sulfate 325 mg (65 mg iron) Tab tablet Take 1 tablet (325 mg total) by mouth daily with breakfast.  Qty: 90 tablet, Refills: 0      hydrocodone-acetaminophen 5-325mg (NORCO) 5-325 mg per tablet Take 1 tablet by mouth every 8 (eight) hours as needed for Pain.  Qty: 12 tablet, Refills: 0      loratadine (CLARITIN) 10 mg tablet Take 10 mg by mouth once daily.      prochlorperazine (COMPAZINE) 10 MG tablet Take 1 tablet (10 mg total) by mouth every 6 (six) hours as needed.  Qty: 15  tablet, Refills: 0      promethazine (PHENERGAN) 25 MG suppository Place 1 suppository (25 mg total) rectally every 6 (six) hours as needed for Nausea.  Qty: 10 suppository, Refills: 0      senna-docusate 8.6-50 mg (PERICOLACE) 8.6-50 mg per tablet Take 1 tablet by mouth 2 (two) times daily.             Lake West MD  Family Medicine  [facility]

## 2017-10-20 PROBLEM — R11.2 NAUSEA AND VOMITING: Status: RESOLVED | Noted: 2017-10-18 | Resolved: 2017-10-20

## 2017-10-20 PROBLEM — R11.2 INTRACTABLE VOMITING WITH NAUSEA: Status: ACTIVE | Noted: 2017-10-20

## 2017-10-21 PROBLEM — K59.09 CHRONIC CONSTIPATION: Status: ACTIVE | Noted: 2017-10-21

## 2017-10-24 PROBLEM — R11.2 NAUSEA AND VOMITING: Status: RESOLVED | Noted: 2017-10-24 | Resolved: 2017-10-19

## 2017-11-17 PROBLEM — K90.49 GASTROINTESTINAL INTOLERANCE TO FOODS: Status: ACTIVE | Noted: 2017-11-17

## 2017-12-18 ENCOUNTER — HOSPITAL ENCOUNTER (EMERGENCY)
Facility: HOSPITAL | Age: 27
Discharge: HOME | End: 2017-12-18
Attending: EMERGENCY MEDICINE
Payer: MEDICAID

## 2017-12-18 VITALS
HEIGHT: 68 IN | RESPIRATION RATE: 20 BRPM | HEART RATE: 71 BPM | OXYGEN SATURATION: 100 % | SYSTOLIC BLOOD PRESSURE: 115 MMHG | TEMPERATURE: 98 F | BODY MASS INDEX: 18.94 KG/M2 | WEIGHT: 125 LBS | DIASTOLIC BLOOD PRESSURE: 68 MMHG
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
## 2017-12-18 DIAGNOSIS — S16.1XXA CERVICAL STRAIN, ACUTE, INITIAL ENCOUNTER: ICD-10-CM
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
## 2017-12-18 PROCEDURE — 99283 EMERGENCY DEPT VISIT LOW MDM: CPT

## 2017-12-18 RX ORDER — PHENYLPROPANOLAMINE/CLEMASTINE 75-1.34MG
200-400 TABLET, EXTENDED RELEASE ORAL
Qty: 20 EACH | Refills: 0 | Status: SHIPPED | OUTPATIENT
Start: 2017-12-18 | End: 2019-01-01

## 2017-12-18 RX ORDER — METHOCARBAMOL 500 MG/1
500-1000 TABLET, FILM COATED ORAL 3 TIMES DAILY PRN
Qty: 20 TABLET | Refills: 0 | Status: SHIPPED | OUTPATIENT
Start: 2017-12-18 | End: 2019-01-01
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
## 2017-12-18 NOTE — DISCHARGE INSTRUCTIONS
Do not drive or operate machinery when using pain medications or muscle relaxers due to sedative side effects

## 2018-03-18 ENCOUNTER — HOSPITAL ENCOUNTER (EMERGENCY)
Facility: HOSPITAL | Age: 28
Discharge: HOME | End: 2018-03-18
Attending: FAMILY MEDICINE
Payer: MEDICAID

## 2018-03-18 VITALS
OXYGEN SATURATION: 99 % | DIASTOLIC BLOOD PRESSURE: 72 MMHG | HEART RATE: 88 BPM | WEIGHT: 125 LBS | BODY MASS INDEX: 19.01 KG/M2 | SYSTOLIC BLOOD PRESSURE: 115 MMHG | TEMPERATURE: 98 F | RESPIRATION RATE: 18 BRPM

## 2018-03-18 DIAGNOSIS — H60.502 ACUTE OTITIS EXTERNA OF LEFT EAR, UNSPECIFIED TYPE: Primary | ICD-10-CM

## 2018-03-18 PROCEDURE — 99283 EMERGENCY DEPT VISIT LOW MDM: CPT

## 2018-03-18 RX ORDER — CIPROFLOXACIN AND DEXAMETHASONE 3; 1 MG/ML; MG/ML
4 SUSPENSION/ DROPS AURICULAR (OTIC) 2 TIMES DAILY
Qty: 7.5 ML | Refills: 0 | Status: SHIPPED | OUTPATIENT
Start: 2018-03-18 | End: 2018-03-25

## 2018-03-18 NOTE — DISCHARGE INSTRUCTIONS
Do not pick the ear.  Use drops as prescribed  Take tylenol or motrin as needed for pain.  Follow up with your primary care provider.  For worsening symptoms, chest pain, shortness of breath, increased abdominal pain, high grade fever, stroke or stroke like symptoms, immediately go to the nearest Emergency Room or call 911 as soon as possible.

## 2018-03-18 NOTE — ED PROVIDER NOTES
"Encounter Date: 3/18/2018       History     Chief Complaint   Patient presents with    Otalgia     left ear pain for 3 days. states she had a bump in ear and tried to bust it.     Patient is a 27 year old female who presents with left ear pain for three days. She has PMH significant for anemia. She reports drainage from the ear. She states it felt like it was swollen and there was a bump in the ear so she started "picking at it". She denied any attempted treatment. She denied decreased hearing. She denied fever and chills. She denied trauma.           Review of patient's allergies indicates:  No Known Allergies  Past Medical History:   Diagnosis Date    Anemia     Encounter for blood transfusion      Past Surgical History:   Procedure Laterality Date     SECTION      mass left breast removed  2006    non-cancerous per pt.     TUBAL LIGATION       Family History   Problem Relation Age of Onset    Diabetes Maternal Grandmother     Hypertension Maternal Grandmother     Diabetes Paternal Grandmother     Hypertension Paternal Grandmother      Social History   Substance Use Topics    Smoking status: Never Smoker    Smokeless tobacco: Never Used    Alcohol use Yes      Comment: Occasionally     Review of Systems   Constitutional: Negative for activity change, appetite change, chills and fever.   HENT: Positive for ear discharge and ear pain. Negative for congestion, rhinorrhea and sore throat.    Eyes: Negative for redness and visual disturbance.   Respiratory: Negative for cough, chest tightness and shortness of breath.    Cardiovascular: Negative for chest pain.   Gastrointestinal: Negative for abdominal pain, diarrhea, nausea and vomiting.   Genitourinary: Negative for dysuria and frequency.   Musculoskeletal: Negative for back pain, neck pain and neck stiffness.   Skin: Negative for rash.   Neurological: Negative for dizziness, syncope, numbness and headaches.       Physical Exam     Initial " "Vitals [03/18/18 1541]   BP Pulse Resp Temp SpO2   115/72 88 18 98.3 °F (36.8 °C) 99 %      MAP       86.33         Physical Exam    Constitutional: Vital signs are normal. She appears well-developed and well-nourished. She is cooperative.  Non-toxic appearance. She does not have a sickly appearance.   HENT:   Head: Normocephalic and atraumatic.   Right Ear: Tympanic membrane and external ear normal.   Left Ear: Tympanic membrane and external ear normal. There is drainage, swelling and tenderness.   Nose: Nose normal.   Mouth/Throat: Oropharynx is clear and moist.   Mild swelling to the left canal with drainage noted. There is no mastoid tenderness. Normal TM.   Eyes: Conjunctivae and lids are normal. Pupils are equal, round, and reactive to light.   Neck: Normal range of motion and full passive range of motion without pain. Neck supple.   Cardiovascular: Normal rate, regular rhythm and normal heart sounds. Exam reveals no gallop and no friction rub.    No murmur heard.  Pulmonary/Chest: Breath sounds normal. She has no wheezes. She has no rhonchi. She has no rales.   Neurological: She is alert and oriented to person, place, and time.   Skin: Skin is warm, dry and intact. No rash noted.         ED Course   Procedures  Labs Reviewed - No data to display          Medical Decision Making:   Initial Assessment:   Patient is a 27 year old female who presents with left ear pain for three days. She has PMH significant for anemia. She reports drainage from the ear. She states it felt like it was swollen and there was a bump in the ear so she started "picking at it". She denied any attempted treatment. She denied decreased hearing. She denied fever and chills. She denied trauma.   Differential Diagnosis:   Mastoiditis  Otitis media  Otitis externa   ED Management:  Emergent evaluation of a 27-year-old female presents with left ear pain for 3 days.  Patient has swelling to the canal with associated discharge.  Her TM is " intact with no sign of otitis media.  No mastoid tenderness.  She denied difficulty hearing.  She denied fever or chills.  We'll treat with Ciprodex.  Return precautions given.  Follow with primary care provider.                      Clinical Impression:   The encounter diagnosis was Acute otitis externa of left ear, unspecified type.                           Sharon Singh PA-C  03/18/18 5886

## 2018-06-06 ENCOUNTER — HOSPITAL ENCOUNTER (EMERGENCY)
Facility: HOSPITAL | Age: 28
Discharge: HOME | End: 2018-06-06
Payer: MEDICAID

## 2018-06-06 VITALS
BODY MASS INDEX: 18.94 KG/M2 | OXYGEN SATURATION: 100 % | TEMPERATURE: 98 F | HEIGHT: 68 IN | RESPIRATION RATE: 20 BRPM | WEIGHT: 125 LBS | DIASTOLIC BLOOD PRESSURE: 67 MMHG | SYSTOLIC BLOOD PRESSURE: 109 MMHG | HEART RATE: 64 BPM

## 2018-06-06 DIAGNOSIS — H10.9 CONJUNCTIVITIS OF LEFT EYE, UNSPECIFIED CONJUNCTIVITIS TYPE: Primary | ICD-10-CM

## 2018-06-06 PROCEDURE — 99283 EMERGENCY DEPT VISIT LOW MDM: CPT

## 2018-06-06 RX ORDER — ERYTHROMYCIN 5 MG/G
OINTMENT OPHTHALMIC
Qty: 1 TUBE | Refills: 0 | Status: SHIPPED | OUTPATIENT
Start: 2018-06-06 | End: 2019-01-01

## 2018-06-06 NOTE — ED PROVIDER NOTES
Encounter Date: 2018       History     Chief Complaint   Patient presents with    Conjunctivitis     Pt states has had crusting and drainage from left eye x 1 week, states this am woke up with pink to sclere and irritation.      27 year old female presents to the emergency department with 7 day history of left eye redness and yellow discharge. She states that the symptoms began gradually 7 days ago and have been constant since onset. She reports seasonal allergies with clear nasal discharge and running. She has attempted treatment with OTC allergie medication and nasal spray without relief of symptoms. She denies any vision changes, eye pain and foreign body sensation. She states that she does not wear contact lenses.           Review of patient's allergies indicates:  No Known Allergies  Past Medical History:   Diagnosis Date    Anemia     Encounter for blood transfusion      Past Surgical History:   Procedure Laterality Date     SECTION      mass left breast removed  2006    non-cancerous per pt.     TUBAL LIGATION       Family History   Problem Relation Age of Onset    Diabetes Maternal Grandmother     Hypertension Maternal Grandmother     Diabetes Paternal Grandmother     Hypertension Paternal Grandmother      Social History   Substance Use Topics    Smoking status: Never Smoker    Smokeless tobacco: Never Used    Alcohol use Yes      Comment: Occasionally     Review of Systems   Constitutional: Negative for fever.   HENT: Positive for congestion, rhinorrhea and sneezing. Negative for ear pain, sinus pain, sinus pressure and sore throat.    Eyes: Positive for discharge and itching. Negative for photophobia, pain and redness.   Respiratory: Negative for cough and shortness of breath.    Cardiovascular: Negative for chest pain.   Gastrointestinal: Negative for abdominal pain and nausea.   Genitourinary: Negative for dysuria.   Musculoskeletal: Negative for neck stiffness.    Neurological: Negative for seizures, light-headedness and headaches.       Physical Exam     Initial Vitals [06/06/18 1106]   BP Pulse Resp Temp SpO2   133/60 60 18 98.2 °F (36.8 °C) 99 %      MAP       84.33         Physical Exam    Constitutional: She appears well-developed and well-nourished.   HENT:   Head: Atraumatic.   Nose: Nose normal.   Mouth/Throat: Oropharyngeal exudate present.   Eyes: EOM are normal. Pupils are equal, round, and reactive to light. Right eye exhibits no discharge. Left eye exhibits no discharge.   Mild erythema noted to the left sclera. No periorbital edema or erythema noted    Neck: Normal range of motion.   Cardiovascular: Normal rate, regular rhythm and normal heart sounds.   Pulmonary/Chest: Breath sounds normal.   Musculoskeletal: Normal range of motion.   Neurological: She is alert and oriented to person, place, and time.   Skin: Skin is warm.         ED Course   Procedures  Labs Reviewed - No data to display       No orders to display        Medical Decision Making:   Initial Assessment:   27 year old female presents to the emergency department with 7 day history of left eye redness and yellow discharge. She states that the symptoms began gradually 7 days ago and have been constant since onset. She reports seasonal allergies with clear nasal discharge and running. She has attempted treatement with OTC allergie medication and nasal spray without relief of symptoms. She denies any vision changes, eye pain and foreign body sensation. Mild erythema noted to the left sclera. No periorbital erythema or edema noted.   Differential Diagnosis:   Probable conjunctivitis. I carefully considered but doubt serious etiology including foreign body, periorbital cellulitis, acute glaucoma and open globe   ED Management:  Probable conjunctivitis, I will prescribe erythromycin ointment. I advised that she use the erythromycin in both the eyes and wash her hands frequently. She is advised to follow  up with her PCP for any persistent symptoms and to return to the emergency department for any new or worsening symptoms.                       Clinical Impression:   The encounter diagnosis was Conjunctivitis of left eye, unspecified conjunctivitis type.      Disposition:   Disposition: Discharged  Condition: Stable                        Concha Salter PA-C  06/06/18 1202

## 2018-06-06 NOTE — DISCHARGE INSTRUCTIONS
You are advised to follow up wuth your primary care provider for persistent symptoms and to return to the emergency hines any new or worsening symptoms.

## 2018-06-22 ENCOUNTER — HOSPITAL ENCOUNTER (EMERGENCY)
Facility: HOSPITAL | Age: 28
Discharge: HOME | End: 2018-06-22
Attending: FAMILY MEDICINE
Payer: MEDICAID

## 2018-06-22 VITALS
DIASTOLIC BLOOD PRESSURE: 76 MMHG | TEMPERATURE: 99 F | HEIGHT: 67 IN | BODY MASS INDEX: 19.62 KG/M2 | OXYGEN SATURATION: 98 % | WEIGHT: 125 LBS | RESPIRATION RATE: 16 BRPM | HEART RATE: 71 BPM | SYSTOLIC BLOOD PRESSURE: 121 MMHG

## 2018-06-22 DIAGNOSIS — R07.81 RIB PAIN ON RIGHT SIDE: ICD-10-CM

## 2018-06-22 DIAGNOSIS — S73.101A HIP SPRAIN, RIGHT, INITIAL ENCOUNTER: Primary | ICD-10-CM
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
## 2018-06-22 LAB
AMORPH CRY UR QL COMP ASSIST: ABNORMAL
B-HCG UR QL: NEGATIVE
BACTERIA #/AREA URNS AUTO: ABNORMAL /HPF
BILIRUB UR QL STRIP: NEGATIVE
CLARITY UR REFRACT.AUTO: CLEAR
COLOR UR AUTO: ABNORMAL
GLUCOSE UR QL STRIP: NEGATIVE
GRAN CASTS UR QL COMP ASSIST: 1 /LPF
HGB UR QL STRIP: NEGATIVE
HYALINE CASTS UR QL AUTO: 4 /LPF
KETONES UR QL STRIP: ABNORMAL
LEUKOCYTE ESTERASE UR QL STRIP: ABNORMAL
MICROSCOPIC COMMENT: ABNORMAL
NITRITE UR QL STRIP: NEGATIVE
PH UR STRIP: 5 [PH] (ref 5–8)
PROT UR QL STRIP: ABNORMAL
RBC #/AREA URNS AUTO: 3 /HPF (ref 0–4)
SP GR UR STRIP: 1.02 (ref 1–1.03)
SQUAMOUS #/AREA URNS AUTO: ABNORMAL /HPF
URN SPEC COLLECT METH UR: ABNORMAL
UROBILINOGEN UR STRIP-ACNC: 1 EU/DL
WBC #/AREA URNS AUTO: 4 /HPF (ref 0–5)

## 2018-06-22 PROCEDURE — 81025 URINE PREGNANCY TEST: CPT

## 2018-06-22 PROCEDURE — 96372 THER/PROPH/DIAG INJ SC/IM: CPT

## 2018-06-22 PROCEDURE — 81000 URINALYSIS NONAUTO W/SCOPE: CPT

## 2018-06-22 PROCEDURE — 99284 EMERGENCY DEPT VISIT MOD MDM: CPT | Mod: 25

## 2018-06-22 PROCEDURE — 63600175 PHARM REV CODE 636 W HCPCS: Performed by: PHYSICIAN ASSISTANT

## 2018-06-22 RX ORDER — KETOROLAC TROMETHAMINE 30 MG/ML
10 INJECTION, SOLUTION INTRAMUSCULAR; INTRAVENOUS
Status: COMPLETED | OUTPATIENT
Start: 2018-06-22 | End: 2018-06-22

## 2018-06-22 RX ORDER — NAPROXEN 500 MG/1
500 TABLET ORAL 2 TIMES DAILY WITH MEALS
Qty: 12 TABLET | Refills: 0 | Status: SHIPPED | OUTPATIENT
Start: 2018-06-22 | End: 2019-01-01

## 2018-06-22 RX ADMIN — KETOROLAC TROMETHAMINE 10 MG: 30 INJECTION, SOLUTION INTRAMUSCULAR at 11:06

## 2018-06-23 NOTE — DISCHARGE INSTRUCTIONS
Your x-rays did not reveal any evidence of fracture or dislocation at this time.  You will be prescribed Naprosyn for symptom control.  You are advised to take multiple deep breaths throughout the day/ You are instructed to follow-up with her primary care provider for reevaluation for reevaluation within 7 days. You are instructed to return to the emergency department immediately for any new or worsening symptoms.

## 2018-06-23 NOTE — ED PROVIDER NOTES
"Encounter Date: 2018       History     Chief Complaint   Patient presents with    Fall     Pt reports "cramp" to R foot causing fall approx 5hrs PTA.  C/O pain to R hip and ribs.  No abnormalities noted.      27-year-old female presents to the emergency department for evaluation of acute right hip and right-sided rib pain status post mechanical slip and fall.  She states that she got a charley horse in her left lower extremity, and was standing on 1 foot when she slipped and fell onto her right side onto a tiled floor.  She reports that she hit the right side on the corner of a wall as she fell down.  She states this happened approximately 5 hours prior to arrival.  She denies head injury, loss of consciousness, neck pain, back pain, chest pain, shortness of breath, abdominal pain, nausea, vomiting, flank pain or hematuria.  No treatment was attempted prior to arrival.  She reports that the pain is a constant 10/10 pain located in the right hip without radiation to the low back, knee or ankle.  She states that the rib pain is a constant 8/10 pain worse with deep breathing and palpation.            Review of patient's allergies indicates:  No Known Allergies  Past Medical History:   Diagnosis Date    Anemia     Encounter for blood transfusion      Past Surgical History:   Procedure Laterality Date     SECTION      mass left breast removed  2006    non-cancerous per pt.     TUBAL LIGATION       Family History   Problem Relation Age of Onset    Diabetes Maternal Grandmother     Hypertension Maternal Grandmother     Diabetes Paternal Grandmother     Hypertension Paternal Grandmother      Social History   Substance Use Topics    Smoking status: Never Smoker    Smokeless tobacco: Never Used    Alcohol use Yes      Comment: Occasionally     Review of Systems   Constitutional: Negative for activity change, appetite change and fever.   HENT: Negative for congestion, ear pain, rhinorrhea, sore " throat and trouble swallowing.    Eyes: Negative for visual disturbance.   Respiratory: Negative for cough, chest tightness and shortness of breath.    Cardiovascular: Negative for chest pain.   Gastrointestinal: Negative for abdominal pain, constipation, diarrhea, nausea and vomiting.   Genitourinary: Negative for decreased urine volume, flank pain and hematuria.   Musculoskeletal: Positive for arthralgias. Negative for back pain, joint swelling and neck pain.   Neurological: Negative for dizziness, syncope, numbness and headaches.       Physical Exam     Initial Vitals [06/22/18 2211]   BP Pulse Resp Temp SpO2   116/73 76 20 98.5 °F (36.9 °C) 100 %      MAP       --         Physical Exam    Nursing note and vitals reviewed.  Constitutional: She appears well-developed and well-nourished. She is not diaphoretic. No distress.   HENT:   Head: Normocephalic and atraumatic.   Right Ear: External ear normal.   Left Ear: External ear normal.   Nose: Nose normal.   Mouth/Throat: Oropharynx is clear and moist.   Eyes: Conjunctivae and EOM are normal. Pupils are equal, round, and reactive to light.   Neck: Normal range of motion. Neck supple.   Cardiovascular: Normal rate, regular rhythm and normal heart sounds.   No murmur heard.  Pulmonary/Chest: Breath sounds normal. No respiratory distress. She has no wheezes. She has no rhonchi. She has no rales. She exhibits no tenderness.       Abdominal: Soft. Bowel sounds are normal. There is no tenderness.   No CVA tenderness noted.   Musculoskeletal:        Right hip: She exhibits tenderness and bony tenderness. She exhibits normal range of motion, normal strength, no swelling and no crepitus.        Right knee: She exhibits normal range of motion. No tenderness found.        Right ankle: She exhibits normal range of motion. No tenderness.        Lumbar back: She exhibits normal range of motion, no tenderness and no bony tenderness.   Lymphadenopathy:     She has no cervical  adenopathy.   Neurological: She is alert and oriented to person, place, and time.   Skin: Skin is warm and dry.   Psychiatric: She has a normal mood and affect.         ED Course   Procedures  Labs Reviewed   URINALYSIS - Abnormal; Notable for the following:        Result Value    Protein, UA 1+ (*)     Ketones, UA 1+ (*)     Leukocytes, UA 1+ (*)     All other components within normal limits   URINALYSIS MICROSCOPIC - Abnormal; Notable for the following:     Bacteria, UA Few (*)     Hyaline Casts, UA 4 (*)     Granular Casts, UA 1 (*)     All other components within normal limits   PREGNANCY TEST, URINE RAPID          Imaging Results          X-Ray Chest PA And Lateral (Final result)  Result time 06/22/18 23:08:18    Final result by Simba James MD (06/22/18 23:08:18)                 Impression:      No acute findings.      Electronically signed by: Simba James MD  Date:    06/22/2018  Time:    23:08             Narrative:    EXAMINATION:  XR CHEST PA AND LATERAL    CLINICAL HISTORY  Chest trauma with chest pain after a fall.    COMPARISON:  09/05/2017    FINDINGS:  The heart size is normal.  The lung fields are clear.  No acute cardiopulmonary infiltrative.                               X-Ray Hip 2 View Right (Final result)  Result time 06/22/18 23:09:05    Final result by Simba James MD (06/22/18 23:09:05)                 Impression:      Negative exam.      Electronically signed by: Simba James MD  Date:    06/22/2018  Time:    23:09             Narrative:    EXAMINATION:  XR HIP 2 VIEW RIGHT    CLINICAL HISTORY:  Right hip injury with pain after a fall.  Fall on same level from slipping, tripping and stumbling without subsequent striking against object, initial encounter    TECHNIQUE:  Standard radiography performed.    COMPARISON:  None    FINDINGS:  Bone density and architecture are normal.  No acute findings.                                 Medical Decision Making:   Initial  Assessment:   27-year-old female presents  for evaluation of acute right hip and right-sided rib pain status post mechanical slip and fall. physical exam reveals a nontoxic-appearing female in no acute distress.  Patient is afebrile vital signs within normal limits.   neurological exam reveals an alert and oriented patient.  No evidence of head injury noted.  No tenderness to palpation noted in the paraspinal musculature of the spinous processes of the cervical, thoracic or lumbar spine.   Tenderness to palpation noted over the lateral aspect of the right-sided lower ribs.  No bony instability or crepitus noted.  No erythema, edema or ecchymosis noted.  Lungs clear to auscultation bilaterally.Distress or accessory muscle use noted.  Examination of the right hip reveals mild tenderness to palpation without evidence of erythema, edema or ecchymosis noted.  Full range of motion, sensation and peripheral pulses intact in lower extremities bilaterally.     Differential Diagnosis:   X-ray of the  chest ordered to assess for possible  rib fracture  or intrathoracic injury.    X-ray of the right hip ordered to assess for possible fracture.  I carefully considered without serious etiology including dislocation or septic joint.   I carefully considered but doubt serious intra-abdominal injury including hemorrhage.  No imaging indicated at this time.   ED Management:  UPT negative.  Urinalysis reveals no evidence of gross hematuria or UTI.  Chest x-ray reveals no acute findings.  X-ray of the right help reveals  a negative exam.  I discussed these findings at length with the patient verbalizes understanding and agreement with the course of treatment.  Patient was given Toradol for pain control.  I will prescribe Naprosyn, and advised the patient to not begin the Naprosyn until tomorrow.   She was instructed to follow up with her  primary care provider for reevaluation and to  return to the emergency department immediately for  any new or worsening symptoms.                        Clinical Impression:   The primary encounter diagnosis was Hip sprain, right, initial encounter. Diagnoses of Fall from slip, trip, or stumble, initial encounter and Rib pain on right side were also pertinent to this visit.                             Piper Sewell PA-C  06/22/18 5781

## 2019-01-01 ENCOUNTER — HOSPITAL ENCOUNTER (EMERGENCY)
Facility: HOSPITAL | Age: 29
Discharge: HOME | End: 2019-01-01
Attending: SURGERY
Payer: MEDICAID

## 2019-01-01 VITALS
WEIGHT: 120 LBS | TEMPERATURE: 98 F | RESPIRATION RATE: 16 BRPM | OXYGEN SATURATION: 98 % | HEART RATE: 78 BPM | DIASTOLIC BLOOD PRESSURE: 64 MMHG | SYSTOLIC BLOOD PRESSURE: 126 MMHG | BODY MASS INDEX: 18.79 KG/M2

## 2019-01-01 DIAGNOSIS — R42 VERTIGO: Primary | ICD-10-CM

## 2019-01-01 DIAGNOSIS — D64.9 ANEMIA, UNSPECIFIED TYPE: ICD-10-CM

## 2019-01-01 LAB
ALBUMIN SERPL BCP-MCNC: 4.3 G/DL
ALP SERPL-CCNC: 71 U/L
ALT SERPL W/O P-5'-P-CCNC: 10 U/L
ANION GAP SERPL CALC-SCNC: 7 MMOL/L
ANISOCYTOSIS BLD QL SMEAR: SLIGHT
AST SERPL-CCNC: 22 U/L
B-HCG UR QL: NEGATIVE
BASOPHILS # BLD AUTO: 0.03 K/UL
BASOPHILS NFR BLD: 0.8 %
BILIRUB SERPL-MCNC: 0.4 MG/DL
BILIRUB UR QL STRIP: NEGATIVE
BUN SERPL-MCNC: 8 MG/DL
CALCIUM SERPL-MCNC: 9.3 MG/DL
CHLORIDE SERPL-SCNC: 105 MMOL/L
CLARITY UR REFRACT.AUTO: ABNORMAL
CO2 SERPL-SCNC: 26 MMOL/L
COLOR UR AUTO: ABNORMAL
CREAT SERPL-MCNC: 0.74 MG/DL
DIFFERENTIAL METHOD: ABNORMAL
EOSINOPHIL # BLD AUTO: 0.1 K/UL
EOSINOPHIL NFR BLD: 1.5 %
ERYTHROCYTE [DISTWIDTH] IN BLOOD BY AUTOMATED COUNT: 17.9 %
EST. GFR  (AFRICAN AMERICAN): >60 ML/MIN/1.73 M^2
EST. GFR  (NON AFRICAN AMERICAN): >60 ML/MIN/1.73 M^2
GLUCOSE SERPL-MCNC: 95 MG/DL
GLUCOSE UR QL STRIP: NEGATIVE
HCT VFR BLD AUTO: 28.2 %
HGB BLD-MCNC: 8.1 G/DL
HGB UR QL STRIP: NEGATIVE
HYPOCHROMIA BLD QL SMEAR: ABNORMAL
KETONES UR QL STRIP: NEGATIVE
LEUKOCYTE ESTERASE UR QL STRIP: ABNORMAL
LYMPHOCYTES # BLD AUTO: 1.2 K/UL
LYMPHOCYTES NFR BLD: 30 %
MCH RBC QN AUTO: 19.6 PG
MCHC RBC AUTO-ENTMCNC: 28.7 G/DL
MCV RBC AUTO: 68 FL
MICROSCOPIC COMMENT: NORMAL
MONOCYTES # BLD AUTO: 0.4 K/UL
MONOCYTES NFR BLD: 10.3 %
NEUTROPHILS # BLD AUTO: 2.3 K/UL
NEUTROPHILS NFR BLD: 57.4 %
NITRITE UR QL STRIP: NEGATIVE
OVALOCYTES BLD QL SMEAR: ABNORMAL
PH UR STRIP: 8 [PH] (ref 5–8)
PLATELET # BLD AUTO: 315 K/UL
PMV BLD AUTO: 10 FL
POIKILOCYTOSIS BLD QL SMEAR: SLIGHT
POTASSIUM SERPL-SCNC: 3.7 MMOL/L
PROT SERPL-MCNC: 7.8 G/DL
PROT UR QL STRIP: NEGATIVE
RBC # BLD AUTO: 4.13 M/UL
SODIUM SERPL-SCNC: 138 MMOL/L
SP GR UR STRIP: 1.01 (ref 1–1.03)
URN SPEC COLLECT METH UR: ABNORMAL
UROBILINOGEN UR STRIP-ACNC: NEGATIVE EU/DL
WBC # BLD AUTO: 3.97 K/UL
WBC #/AREA URNS AUTO: 2 /HPF (ref 0–5)

## 2019-01-01 PROCEDURE — 99284 EMERGENCY DEPT VISIT MOD MDM: CPT | Mod: 25,ER

## 2019-01-01 PROCEDURE — 25000003 PHARM REV CODE 250: Mod: ER | Performed by: SURGERY

## 2019-01-01 PROCEDURE — 81000 URINALYSIS NONAUTO W/SCOPE: CPT | Mod: ER

## 2019-01-01 PROCEDURE — 80053 COMPREHEN METABOLIC PANEL: CPT | Mod: ER

## 2019-01-01 PROCEDURE — 96374 THER/PROPH/DIAG INJ IV PUSH: CPT | Mod: ER

## 2019-01-01 PROCEDURE — 81025 URINE PREGNANCY TEST: CPT | Mod: ER

## 2019-01-01 PROCEDURE — 96361 HYDRATE IV INFUSION ADD-ON: CPT | Mod: ER

## 2019-01-01 PROCEDURE — 63600175 PHARM REV CODE 636 W HCPCS: Mod: ER | Performed by: SURGERY

## 2019-01-01 PROCEDURE — 85025 COMPLETE CBC W/AUTO DIFF WBC: CPT | Mod: ER

## 2019-01-01 RX ORDER — METHYLPREDNISOLONE SOD SUCC 125 MG
125 VIAL (EA) INJECTION
Status: COMPLETED | OUTPATIENT
Start: 2019-01-01 | End: 2019-01-01

## 2019-01-01 RX ORDER — MECLIZINE HYDROCHLORIDE 25 MG/1
25 TABLET ORAL 3 TIMES DAILY PRN
Qty: 20 TABLET | Refills: 0 | Status: SHIPPED | OUTPATIENT
Start: 2019-01-01

## 2019-01-01 RX ADMIN — SODIUM CHLORIDE 1000 ML: 0.9 INJECTION, SOLUTION INTRAVENOUS at 05:01

## 2019-01-01 RX ADMIN — METHYLPREDNISOLONE SODIUM SUCCINATE 125 MG: 125 INJECTION, POWDER, FOR SOLUTION INTRAMUSCULAR; INTRAVENOUS at 05:01

## 2019-01-01 NOTE — ED PROVIDER NOTES
Encounter Date: 2019       History     Chief Complaint   Patient presents with    Dizziness     Dizzyness for 2 week- i do have iron problems but i dont take iron bc it makes my stomach hurt       Patient with a 2 week history of dizziness.  She states that she has been anemic before but every time she takes iron pills  The medicine upset her stomach.  She has not been told why she is anemic      The history is provided by the patient.   Dizziness   This is a new problem. The current episode started more than 1 week ago. The problem occurs daily. The problem has not changed since onset.Pertinent negatives include no chest pain, no abdominal pain and no headaches. Nothing aggravates the symptoms. Nothing relieves the symptoms. She has tried nothing for the symptoms. The treatment provided no relief.     Review of patient's allergies indicates:  No Known Allergies  Past Medical History:   Diagnosis Date    Anemia     Encounter for blood transfusion      Past Surgical History:   Procedure Laterality Date     SECTION       SECTION Left 2012    Performed by [facility] at [facility]    DELIVERY-CEASAREAN SECTION N/A 2015    Performed by Shiva Villagran MD at [facility]    mass left breast removed  2006    non-cancerous per pt.     TUBAL LIGATION       Family History   Problem Relation Age of Onset    Diabetes Maternal Grandmother     Hypertension Maternal Grandmother     Diabetes Paternal Grandmother     Hypertension Paternal Grandmother      Social History     Tobacco Use    Smoking status: Never Smoker    Smokeless tobacco: Never Used   Substance Use Topics    Alcohol use: Yes     Comment: Occasionally    Drug use: Yes     Types: Marijuana     Comment: daily     Review of Systems   Constitutional: Negative.    HENT: Negative.    Eyes: Negative.    Respiratory: Negative.    Cardiovascular: Negative.  Negative for chest pain.   Gastrointestinal: Negative.   Negative for abdominal pain.   Endocrine: Negative.    Genitourinary: Negative.    Musculoskeletal: Negative.    Skin: Negative.    Allergic/Immunologic: Negative.    Neurological: Positive for dizziness. Negative for headaches.   Hematological: Negative.    Psychiatric/Behavioral: Negative.        Physical Exam     Initial Vitals [01/01/19 1635]   BP Pulse Resp Temp SpO2   116/67 77 15 97.8 °F (36.6 °C) 98 %      MAP       --         Physical Exam    Nursing note and vitals reviewed.  Constitutional: She appears well-developed and well-nourished.   HENT:   Head: Normocephalic.   Eyes: Pupils are equal, round, and reactive to light.   Neck: Normal range of motion.   Cardiovascular: Normal rate, regular rhythm, normal heart sounds and intact distal pulses.   Pulmonary/Chest: Breath sounds normal.   Abdominal: Soft.   Musculoskeletal: Normal range of motion.   Neurological: She is alert and oriented to person, place, and time. She has normal strength. GCS score is 15. GCS eye subscore is 4. GCS verbal subscore is 5. GCS motor subscore is 6.   Skin: Skin is warm and dry. Capillary refill takes less than 2 seconds.   Psychiatric: She has a normal mood and affect.         ED Course   Procedures  Labs Reviewed   CBC W/ AUTO DIFFERENTIAL - Abnormal; Notable for the following components:       Result Value    Hemoglobin 8.1 (*)     Hematocrit 28.2 (*)     MCV 68 (*)     MCH 19.6 (*)     MCHC 28.7 (*)     RDW 17.9 (*)     All other components within normal limits   COMPREHENSIVE METABOLIC PANEL - Abnormal; Notable for the following components:    Anion Gap 7 (*)     All other components within normal limits   URINALYSIS, REFLEX TO URINE CULTURE   PREGNANCY TEST, URINE RAPID          Imaging Results    None          Medical Decision Making:   Initial Assessment:    Dizziness with mild anemia  ED Management:   Patient has a mild anemia of 28. Recommend follow-up with primary doctor for workup                      Clinical  Impression:   The primary encounter diagnosis was Vertigo. A diagnosis of Anemia, unspecified type was also pertinent to this visit.      Disposition:   Disposition: Discharged  Condition: Stable                        CStefan Pederson III, MD  01/01/19 0490

## 2019-02-11 ENCOUNTER — HOSPITAL ENCOUNTER (EMERGENCY)
Facility: HOSPITAL | Age: 29
Discharge: HOME | End: 2019-02-11
Attending: FAMILY MEDICINE
Payer: MEDICAID

## 2019-02-11 VITALS
OXYGEN SATURATION: 100 % | HEIGHT: 68 IN | WEIGHT: 125 LBS | TEMPERATURE: 99 F | HEART RATE: 85 BPM | DIASTOLIC BLOOD PRESSURE: 77 MMHG | RESPIRATION RATE: 20 BRPM | BODY MASS INDEX: 18.94 KG/M2 | SYSTOLIC BLOOD PRESSURE: 107 MMHG

## 2019-02-11 DIAGNOSIS — J02.9 SORE THROAT: ICD-10-CM

## 2019-02-11 DIAGNOSIS — R53.83 FATIGUE, UNSPECIFIED TYPE: ICD-10-CM

## 2019-02-11 DIAGNOSIS — R68.89 FLU-LIKE SYMPTOMS: Primary | ICD-10-CM

## 2019-02-11 DIAGNOSIS — R52 GENERALIZED BODY ACHES: ICD-10-CM

## 2019-02-11 DIAGNOSIS — R05.8 DRY COUGH: ICD-10-CM

## 2019-02-11 LAB
DEPRECATED S PYO AG THROAT QL EIA: NEGATIVE
FLUAV AG SPEC QL IA: NEGATIVE
FLUBV AG SPEC QL IA: NEGATIVE
SPECIMEN SOURCE: NORMAL

## 2019-02-11 PROCEDURE — 63600175 PHARM REV CODE 636 W HCPCS: Mod: ER | Performed by: PHYSICIAN ASSISTANT

## 2019-02-11 PROCEDURE — 96372 THER/PROPH/DIAG INJ SC/IM: CPT | Mod: ER

## 2019-02-11 PROCEDURE — 87400 INFLUENZA A/B EACH AG IA: CPT | Mod: ER

## 2019-02-11 PROCEDURE — 99284 EMERGENCY DEPT VISIT MOD MDM: CPT | Mod: 25,ER

## 2019-02-11 PROCEDURE — 25000003 PHARM REV CODE 250: Mod: ER | Performed by: PHYSICIAN ASSISTANT

## 2019-02-11 PROCEDURE — 87880 STREP A ASSAY W/OPTIC: CPT | Mod: ER

## 2019-02-11 PROCEDURE — 87081 CULTURE SCREEN ONLY: CPT | Mod: ER

## 2019-02-11 RX ORDER — IBUPROFEN 600 MG/1
600 TABLET ORAL
Status: COMPLETED | OUTPATIENT
Start: 2019-02-11 | End: 2019-02-11

## 2019-02-11 RX ORDER — DEXAMETHASONE SODIUM PHOSPHATE 4 MG/ML
8 INJECTION, SOLUTION INTRA-ARTICULAR; INTRALESIONAL; INTRAMUSCULAR; INTRAVENOUS; SOFT TISSUE
Status: COMPLETED | OUTPATIENT
Start: 2019-02-11 | End: 2019-02-11

## 2019-02-11 RX ORDER — IBUPROFEN 600 MG/1
600 TABLET ORAL EVERY 8 HOURS PRN
Qty: 15 TABLET | Refills: 0 | Status: SHIPPED | OUTPATIENT
Start: 2019-02-11 | End: 2019-02-16

## 2019-02-11 RX ADMIN — IBUPROFEN 600 MG: 600 TABLET, FILM COATED ORAL at 10:02

## 2019-02-11 RX ADMIN — DEXAMETHASONE SODIUM PHOSPHATE 8 MG: 4 INJECTION, SOLUTION INTRAMUSCULAR; INTRAVENOUS at 10:02

## 2019-02-11 NOTE — ED PROVIDER NOTES
Encounter Date: 2019       History     Chief Complaint   Patient presents with    Sore Throat     PT rpeorts sore throat x 3 days. Denies fever     Patient is a 28-year-old female presenting to ED today with complaints of x3 days flu like symptoms including sore throat, generalized body aches and mild dry cough.  Patient denies any fevers, sweats, chills, chest pain, shortness of breath, abdominal pain, nausea, vomiting, diarrhea or any other physical complaints this time.  Patient reports her daughter is also sick with similar symptoms. Patient denies taking any OTC medications to assist.  Patient denies any daily medications or other pertinent past medical history.  No alleviating factors noted.      The history is provided by the patient.     Review of patient's allergies indicates:  No Known Allergies  Past Medical History:   Diagnosis Date    Anemia     Encounter for blood transfusion      Past Surgical History:   Procedure Laterality Date     SECTION       SECTION Left 2012    Performed by [facility]ELSY [facility]rios [facility] at [facility]    DELIVERY-CEASAREAN SECTION N/A 2015    Performed by Shiva Villagran MD at [facility]    mass left breast removed  2006    non-cancerous per pt.     TUBAL LIGATION       Family History   Problem Relation Age of Onset    Diabetes Maternal Grandmother     Hypertension Maternal Grandmother     Diabetes Paternal Grandmother     Hypertension Paternal Grandmother      Social History     Tobacco Use    Smoking status: Never Smoker    Smokeless tobacco: Never Used   Substance Use Topics    Alcohol use: Yes     Comment: Occasionally    Drug use: Yes     Types: Marijuana     Comment: daily     Review of Systems   Constitutional: Positive for fatigue. Negative for chills, diaphoresis and fever.   HENT: Positive for sore throat. Negative for congestion, ear discharge, ear pain, facial swelling, nosebleeds, sinus pain and trouble swallowing.     Eyes: Negative for photophobia, pain and visual disturbance.   Respiratory: Positive for cough. Negative for choking, chest tightness, shortness of breath, wheezing and stridor.    Cardiovascular: Negative for chest pain and palpitations.   Gastrointestinal: Negative for abdominal pain, diarrhea, nausea and vomiting.   Endocrine: Negative.    Genitourinary: Negative for dysuria, flank pain, hematuria and pelvic pain.   Musculoskeletal: Negative for back pain, myalgias and neck pain.   Skin: Negative.    Allergic/Immunologic: Negative.    Neurological: Negative for dizziness, tremors, weakness, light-headedness, numbness and headaches.   Hematological: Negative.    Psychiatric/Behavioral: Negative.        Physical Exam     Initial Vitals [02/11/19 0853]   BP Pulse Resp Temp SpO2   107/77 85 20 98.7 °F (37.1 °C) 100 %      MAP       --         Physical Exam    Nursing note and vitals reviewed.  Constitutional: Vital signs are normal. She appears well-developed and well-nourished. She is not diaphoretic. No distress.   Patient is a 28-year-old female sitting upright in no acute distress, nontoxic, AAO x4, responding appropriately to questioning with slight for hoarse phonation, breathing normally on room air.   HENT:   Head: Normocephalic and atraumatic.   Right Ear: Hearing and external ear normal. No mastoid tenderness.   Left Ear: Hearing and external ear normal. No mastoid tenderness.   Nose: Nose normal. No mucosal edema, rhinorrhea, nose lacerations, sinus tenderness, nasal deformity, septal deviation or nasal septal hematoma. No epistaxis. Right sinus exhibits no maxillary sinus tenderness and no frontal sinus tenderness. Left sinus exhibits no maxillary sinus tenderness and no frontal sinus tenderness.   Mouth/Throat: Uvula is midline and mucous membranes are normal. No oral lesions. No trismus in the jaw. No dental abscesses or uvula swelling. Posterior oropharyngeal erythema present. No oropharyngeal  exudate, posterior oropharyngeal edema or tonsillar abscesses.   Eyes: Conjunctivae and EOM are normal. Pupils are equal, round, and reactive to light.   Neck: Trachea normal and normal range of motion. Neck supple.   Full active range of motion, nontender, no adenopathy, no stridor, no meningeal signs.   Cardiovascular: Normal rate, regular rhythm, normal heart sounds, intact distal pulses and normal pulses.   Pulmonary/Chest: Effort normal and breath sounds normal. No accessory muscle usage or stridor. No tachypnea. No respiratory distress. She has no decreased breath sounds. She has no wheezes. She exhibits no tenderness.   Lungs clear to auscultation bilaterally, no wheezing or stridor, patient breathing normally on room air.   Abdominal: Soft. Bowel sounds are normal. She exhibits no distension. There is no tenderness. There is no rigidity, no rebound, no guarding and no CVA tenderness.   Musculoskeletal: Normal range of motion. She exhibits no edema or tenderness.   Lymphadenopathy:     She has no cervical adenopathy.   Neurological: She is alert and oriented to person, place, and time. She has normal strength. She displays no tremor. No sensory deficit. She exhibits normal muscle tone. She displays no seizure activity. Coordination normal. GCS score is 15. GCS eye subscore is 4. GCS verbal subscore is 5. GCS motor subscore is 6.   Neurovascularly intact in full   Skin: Skin is warm and dry. Capillary refill takes less than 2 seconds. No rash noted. No erythema.         ED Course   Procedures  Labs Reviewed   THROAT SCREEN, RAPID   CULTURE, STREP A,  THROAT   INFLUENZA A AND B ANTIGEN          Imaging Results    None          Medical Decision Making:   Initial Assessment:   Patient is a 28-year-old female presenting to ED today with complaints of x3 days flu like symptoms including sore throat, generalized body aches and mild dry cough.  Patient denies any fevers, sweats, chills, chest pain, shortness of  breath, abdominal pain, nausea, vomiting, diarrhea or any other physical complaints this time.  Patient reports her daughter is also sick with similar symptoms. Patient denies taking any OTC medications to assist.  Patient denies any daily medications or other pertinent past medical history.  No alleviating factors noted.  Differential Diagnosis:   Influenza  Strep pharyngitis  Viral illness    Clinical Tests:   Lab Tests: Ordered and Reviewed  ED Management:  Discussed care plan with patient.  Discussed negative strep flu testing.  Patient with likely viral illness causing her symptoms seeing that her daughter also has similar symptoms with benign testing.  Patient educated on symptomatic management, maintaining adequate hydration/diet, PCP follow-up and to ED return precautions.  Patient is stable, no acute distress, nontoxic, neurovascular intact, vital signs stable, all questions answered.                      Clinical Impression:   The primary encounter diagnosis was Flu-like symptoms. Diagnoses of Sore throat, Generalized body aches, Fatigue, unspecified type, and Dry cough were also pertinent to this visit.                             Marium Hyde PA-C  02/11/19 1019

## 2019-02-11 NOTE — DISCHARGE INSTRUCTIONS
Maintain adequate hydration and healthy diet.  Take prescribed medication as directed as needed to assist in symptoms.  Follow up with PCP for continued care, return to ED with any worsening of symptoms or condition.

## 2019-02-13 LAB — BACTERIA THROAT CULT: NORMAL

## 2019-02-14 ENCOUNTER — HOSPITAL ENCOUNTER (EMERGENCY)
Facility: HOSPITAL | Age: 29
Discharge: HOME | End: 2019-02-14
Attending: FAMILY MEDICINE
Payer: MEDICAID

## 2019-02-14 VITALS
WEIGHT: 125 LBS | RESPIRATION RATE: 20 BRPM | HEIGHT: 68 IN | TEMPERATURE: 99 F | BODY MASS INDEX: 18.94 KG/M2 | DIASTOLIC BLOOD PRESSURE: 70 MMHG | HEART RATE: 95 BPM | OXYGEN SATURATION: 100 % | SYSTOLIC BLOOD PRESSURE: 114 MMHG

## 2019-02-14 DIAGNOSIS — R05.9 COUGH: ICD-10-CM

## 2019-02-14 DIAGNOSIS — J10.1 INFLUENZA A: Primary | ICD-10-CM

## 2019-02-14 LAB
B-HCG UR QL: NEGATIVE
BILIRUB UR QL STRIP: NEGATIVE
CLARITY UR REFRACT.AUTO: CLEAR
COLOR UR AUTO: ABNORMAL
GLUCOSE UR QL STRIP: NEGATIVE
HGB UR QL STRIP: NEGATIVE
INFLUENZA A, MOLECULAR: POSITIVE
INFLUENZA B, MOLECULAR: NEGATIVE
KETONES UR QL STRIP: NEGATIVE
LEUKOCYTE ESTERASE UR QL STRIP: ABNORMAL
MICROSCOPIC COMMENT: NORMAL
NITRITE UR QL STRIP: NEGATIVE
PH UR STRIP: 6 [PH] (ref 5–8)
PROT UR QL STRIP: NEGATIVE
SP GR UR STRIP: 1.02 (ref 1–1.03)
SPECIMEN SOURCE: ABNORMAL
URN SPEC COLLECT METH UR: ABNORMAL
UROBILINOGEN UR STRIP-ACNC: >=8 EU/DL
WBC #/AREA URNS AUTO: 1 /HPF (ref 0–5)

## 2019-02-14 PROCEDURE — 25000003 PHARM REV CODE 250: Mod: ER | Performed by: PHYSICIAN ASSISTANT

## 2019-02-14 PROCEDURE — 81000 URINALYSIS NONAUTO W/SCOPE: CPT | Mod: ER

## 2019-02-14 PROCEDURE — 81025 URINE PREGNANCY TEST: CPT | Mod: ER

## 2019-02-14 PROCEDURE — 99284 EMERGENCY DEPT VISIT MOD MDM: CPT | Mod: ER

## 2019-02-14 PROCEDURE — 87502 INFLUENZA DNA AMP PROBE: CPT | Mod: ER

## 2019-02-14 RX ORDER — ACETAMINOPHEN 500 MG
1000 TABLET ORAL
Status: COMPLETED | OUTPATIENT
Start: 2019-02-14 | End: 2019-02-14

## 2019-02-14 RX ORDER — BENZONATATE 100 MG/1
100 CAPSULE ORAL 3 TIMES DAILY PRN
Qty: 10 CAPSULE | Refills: 0 | Status: SHIPPED | OUTPATIENT
Start: 2019-02-14 | End: 2019-02-24

## 2019-02-14 RX ADMIN — ACETAMINOPHEN 1000 MG: 500 TABLET ORAL at 11:02

## 2019-02-14 NOTE — DISCHARGE INSTRUCTIONS
Your chestX-ray did not reveal any evidence of pneumonia or consolidation at this time.  You tested positive for influenza A.  You are outside of the timeframe for Tamiflu.  Symptomatic treatment advised including plenty of clear fluid, rest and Tylenol/Motrin for any fever body aches.  You are instructed to follow up with her primary care provider for re-evaluation and to return to the emergency department immediately for any new or worsening symptoms

## 2019-10-17 NOTE — ED NOTES
Phoned radiology to inform of pt's xray  
Warm blanket and gown provided to pt. Informed pt that the wait is on the xray tech, within minutes.  
How Severe Are They?: moderate
Is This A New Presentation, Or A Follow-Up?: Skin Lesions